# Patient Record
Sex: FEMALE | Race: WHITE | Employment: OTHER | URBAN - METROPOLITAN AREA
[De-identification: names, ages, dates, MRNs, and addresses within clinical notes are randomized per-mention and may not be internally consistent; named-entity substitution may affect disease eponyms.]

---

## 2020-11-03 PROBLEM — I10 HTN (HYPERTENSION): Status: RESOLVED | Noted: 2020-11-03 | Resolved: 2020-11-03

## 2021-08-16 ENCOUNTER — HOSPITAL ENCOUNTER (INPATIENT)
Age: 71
LOS: 9 days | Discharge: NURSING, REHAB OR LONG-TERM CARE | DRG: 683 | End: 2021-08-25
Attending: EMERGENCY MEDICINE | Admitting: INTERNAL MEDICINE
Payer: MEDICARE

## 2021-08-16 ENCOUNTER — APPOINTMENT (OUTPATIENT)
Dept: CT IMAGING | Age: 71
DRG: 683 | End: 2021-08-16
Payer: MEDICARE

## 2021-08-16 ENCOUNTER — APPOINTMENT (OUTPATIENT)
Dept: GENERAL RADIOLOGY | Age: 71
DRG: 683 | End: 2021-08-16
Payer: MEDICARE

## 2021-08-16 DIAGNOSIS — F41.1 GAD (GENERALIZED ANXIETY DISORDER): ICD-10-CM

## 2021-08-16 DIAGNOSIS — R53.1 GENERAL WEAKNESS: Primary | ICD-10-CM

## 2021-08-16 DIAGNOSIS — N17.9 AKI (ACUTE KIDNEY INJURY) (HCC): ICD-10-CM

## 2021-08-16 PROBLEM — N18.9 ACUTE ON CHRONIC RENAL INSUFFICIENCY: Status: ACTIVE | Noted: 2021-08-16

## 2021-08-16 PROBLEM — N28.9 ACUTE ON CHRONIC RENAL INSUFFICIENCY: Status: ACTIVE | Noted: 2021-08-16

## 2021-08-16 LAB
A/G RATIO: 1.6 (ref 1.1–2.2)
ALBUMIN SERPL-MCNC: 4.9 G/DL (ref 3.4–5)
ALP BLD-CCNC: 57 U/L (ref 40–129)
ALT SERPL-CCNC: 12 U/L (ref 10–40)
ANION GAP SERPL CALCULATED.3IONS-SCNC: 19 MMOL/L (ref 3–16)
AST SERPL-CCNC: 22 U/L (ref 15–37)
BASOPHILS ABSOLUTE: 0.1 K/UL (ref 0–0.2)
BASOPHILS RELATIVE PERCENT: 1.2 %
BILIRUB SERPL-MCNC: <0.2 MG/DL (ref 0–1)
BILIRUBIN URINE: NEGATIVE
BLOOD, URINE: NEGATIVE
BUN BLDV-MCNC: 37 MG/DL (ref 7–20)
CALCIUM SERPL-MCNC: 10.3 MG/DL (ref 8.3–10.6)
CHLORIDE BLD-SCNC: 96 MMOL/L (ref 99–110)
CLARITY: CLEAR
CO2: 18 MMOL/L (ref 21–32)
COLOR: YELLOW
CREAT SERPL-MCNC: 3 MG/DL (ref 0.6–1.2)
EOSINOPHILS ABSOLUTE: 0.1 K/UL (ref 0–0.6)
EOSINOPHILS RELATIVE PERCENT: 1.5 %
GFR AFRICAN AMERICAN: 19
GFR NON-AFRICAN AMERICAN: 15
GLOBULIN: 3.1 G/DL
GLUCOSE BLD-MCNC: 88 MG/DL (ref 70–99)
GLUCOSE URINE: NEGATIVE MG/DL
HCT VFR BLD CALC: 33.3 % (ref 36–48)
HEMOGLOBIN: 11.2 G/DL (ref 12–16)
KETONES, URINE: NEGATIVE MG/DL
LEUKOCYTE ESTERASE, URINE: NEGATIVE
LYMPHOCYTES ABSOLUTE: 1.7 K/UL (ref 1–5.1)
LYMPHOCYTES RELATIVE PERCENT: 28.7 %
MCH RBC QN AUTO: 29.7 PG (ref 26–34)
MCHC RBC AUTO-ENTMCNC: 33.6 G/DL (ref 31–36)
MCV RBC AUTO: 88.4 FL (ref 80–100)
MICROSCOPIC EXAMINATION: NORMAL
MONOCYTES ABSOLUTE: 0.4 K/UL (ref 0–1.3)
MONOCYTES RELATIVE PERCENT: 5.8 %
NEUTROPHILS ABSOLUTE: 3.8 K/UL (ref 1.7–7.7)
NEUTROPHILS RELATIVE PERCENT: 62.8 %
NITRITE, URINE: NEGATIVE
PDW BLD-RTO: 20.3 % (ref 12.4–15.4)
PH UA: 6 (ref 5–8)
PLATELET # BLD: 200 K/UL (ref 135–450)
PMV BLD AUTO: 8.1 FL (ref 5–10.5)
POTASSIUM REFLEX MAGNESIUM: 3.7 MMOL/L (ref 3.5–5.1)
PRO-BNP: 1059 PG/ML (ref 0–124)
PROTEIN UA: NEGATIVE MG/DL
RBC # BLD: 3.76 M/UL (ref 4–5.2)
SODIUM BLD-SCNC: 133 MMOL/L (ref 136–145)
SPECIFIC GRAVITY UA: 1.01 (ref 1–1.03)
TOTAL PROTEIN: 8 G/DL (ref 6.4–8.2)
TROPONIN: 0.07 NG/ML
URINE REFLEX TO CULTURE: NORMAL
URINE TYPE: NORMAL
UROBILINOGEN, URINE: 0.2 E.U./DL
WBC # BLD: 6.1 K/UL (ref 4–11)

## 2021-08-16 PROCEDURE — 93005 ELECTROCARDIOGRAM TRACING: CPT | Performed by: NURSE PRACTITIONER

## 2021-08-16 PROCEDURE — 80053 COMPREHEN METABOLIC PANEL: CPT

## 2021-08-16 PROCEDURE — 81003 URINALYSIS AUTO W/O SCOPE: CPT

## 2021-08-16 PROCEDURE — 2580000003 HC RX 258: Performed by: NURSE PRACTITIONER

## 2021-08-16 PROCEDURE — 70450 CT HEAD/BRAIN W/O DYE: CPT

## 2021-08-16 PROCEDURE — 83880 ASSAY OF NATRIURETIC PEPTIDE: CPT

## 2021-08-16 PROCEDURE — U0005 INFEC AGEN DETEC AMPLI PROBE: HCPCS

## 2021-08-16 PROCEDURE — 85025 COMPLETE CBC W/AUTO DIFF WBC: CPT

## 2021-08-16 PROCEDURE — U0003 INFECTIOUS AGENT DETECTION BY NUCLEIC ACID (DNA OR RNA); SEVERE ACUTE RESPIRATORY SYNDROME CORONAVIRUS 2 (SARS-COV-2) (CORONAVIRUS DISEASE [COVID-19]), AMPLIFIED PROBE TECHNIQUE, MAKING USE OF HIGH THROUGHPUT TECHNOLOGIES AS DESCRIBED BY CMS-2020-01-R: HCPCS

## 2021-08-16 PROCEDURE — 71045 X-RAY EXAM CHEST 1 VIEW: CPT

## 2021-08-16 PROCEDURE — 1200000000 HC SEMI PRIVATE

## 2021-08-16 PROCEDURE — 84484 ASSAY OF TROPONIN QUANT: CPT

## 2021-08-16 PROCEDURE — 99284 EMERGENCY DEPT VISIT MOD MDM: CPT

## 2021-08-16 RX ORDER — 0.9 % SODIUM CHLORIDE 0.9 %
1000 INTRAVENOUS SOLUTION INTRAVENOUS ONCE
Status: COMPLETED | OUTPATIENT
Start: 2021-08-16 | End: 2021-08-16

## 2021-08-16 RX ADMIN — SODIUM CHLORIDE 1000 ML: 9 INJECTION, SOLUTION INTRAVENOUS at 22:35

## 2021-08-16 ASSESSMENT — PAIN SCALES - GENERAL: PAINLEVEL_OUTOF10: 8

## 2021-08-16 ASSESSMENT — PAIN DESCRIPTION - DESCRIPTORS: DESCRIPTORS: ACHING;DISCOMFORT

## 2021-08-16 ASSESSMENT — ENCOUNTER SYMPTOMS
VOMITING: 0
ABDOMINAL PAIN: 0
COLOR CHANGE: 0
NAUSEA: 0
DIARRHEA: 0
SHORTNESS OF BREATH: 0
COUGH: 0
ABDOMINAL DISTENTION: 0

## 2021-08-16 ASSESSMENT — PAIN DESCRIPTION - LOCATION: LOCATION: GENERALIZED

## 2021-08-16 ASSESSMENT — PAIN DESCRIPTION - PAIN TYPE: TYPE: ACUTE PAIN

## 2021-08-17 LAB
ALBUMIN SERPL-MCNC: 4.1 G/DL (ref 3.4–5)
ANION GAP SERPL CALCULATED.3IONS-SCNC: 19 MMOL/L (ref 3–16)
BUN BLDV-MCNC: 37 MG/DL (ref 7–20)
CALCIUM SERPL-MCNC: 8.8 MG/DL (ref 8.3–10.6)
CHLORIDE BLD-SCNC: 105 MMOL/L (ref 99–110)
CO2: 15 MMOL/L (ref 21–32)
CREAT SERPL-MCNC: 3.3 MG/DL (ref 0.6–1.2)
CREATININE URINE: 36.9 MG/DL (ref 28–259)
GFR AFRICAN AMERICAN: 17
GFR NON-AFRICAN AMERICAN: 14
GLUCOSE BLD-MCNC: 75 MG/DL (ref 70–99)
OSMOLALITY URINE: 307 MOSM/KG (ref 390–1070)
PHOSPHORUS: 3.1 MG/DL (ref 2.5–4.9)
POTASSIUM SERPL-SCNC: 3.7 MMOL/L (ref 3.5–5.1)
SARS-COV-2: NOT DETECTED
SODIUM BLD-SCNC: 139 MMOL/L (ref 136–145)
SODIUM URINE: 90 MMOL/L
T4 FREE: 0.2 NG/DL (ref 0.9–1.8)
TROPONIN: 0.06 NG/ML
TSH REFLEX: 96.78 UIU/ML (ref 0.27–4.2)

## 2021-08-17 PROCEDURE — 1200000000 HC SEMI PRIVATE

## 2021-08-17 PROCEDURE — 2580000003 HC RX 258: Performed by: NURSE PRACTITIONER

## 2021-08-17 PROCEDURE — 94761 N-INVAS EAR/PLS OXIMETRY MLT: CPT

## 2021-08-17 PROCEDURE — 82570 ASSAY OF URINE CREATININE: CPT

## 2021-08-17 PROCEDURE — 6370000000 HC RX 637 (ALT 250 FOR IP): Performed by: INTERNAL MEDICINE

## 2021-08-17 PROCEDURE — 97162 PT EVAL MOD COMPLEX 30 MIN: CPT

## 2021-08-17 PROCEDURE — 97530 THERAPEUTIC ACTIVITIES: CPT

## 2021-08-17 PROCEDURE — 97165 OT EVAL LOW COMPLEX 30 MIN: CPT

## 2021-08-17 PROCEDURE — 84484 ASSAY OF TROPONIN QUANT: CPT

## 2021-08-17 PROCEDURE — 84300 ASSAY OF URINE SODIUM: CPT

## 2021-08-17 PROCEDURE — 6370000000 HC RX 637 (ALT 250 FOR IP): Performed by: NURSE PRACTITIONER

## 2021-08-17 PROCEDURE — 6360000002 HC RX W HCPCS: Performed by: NURSE PRACTITIONER

## 2021-08-17 PROCEDURE — 97535 SELF CARE MNGMENT TRAINING: CPT

## 2021-08-17 PROCEDURE — 83935 ASSAY OF URINE OSMOLALITY: CPT

## 2021-08-17 PROCEDURE — 84439 ASSAY OF FREE THYROXINE: CPT

## 2021-08-17 PROCEDURE — 84443 ASSAY THYROID STIM HORMONE: CPT

## 2021-08-17 PROCEDURE — 36415 COLL VENOUS BLD VENIPUNCTURE: CPT

## 2021-08-17 PROCEDURE — 80069 RENAL FUNCTION PANEL: CPT

## 2021-08-17 PROCEDURE — 51702 INSERT TEMP BLADDER CATH: CPT

## 2021-08-17 RX ORDER — ACETAMINOPHEN 325 MG/1
650 TABLET ORAL EVERY 6 HOURS PRN
Status: DISCONTINUED | OUTPATIENT
Start: 2021-08-17 | End: 2021-08-25 | Stop reason: HOSPADM

## 2021-08-17 RX ORDER — NICOTINE 21 MG/24HR
1 PATCH, TRANSDERMAL 24 HOURS TRANSDERMAL DAILY
Status: DISCONTINUED | OUTPATIENT
Start: 2021-08-17 | End: 2021-08-25 | Stop reason: HOSPADM

## 2021-08-17 RX ORDER — DIVALPROEX SODIUM 125 MG/1
125 CAPSULE, COATED PELLETS ORAL ONCE
Status: COMPLETED | OUTPATIENT
Start: 2021-08-17 | End: 2021-08-17

## 2021-08-17 RX ORDER — SODIUM CHLORIDE 0.9 % (FLUSH) 0.9 %
5-40 SYRINGE (ML) INJECTION EVERY 12 HOURS SCHEDULED
Status: DISCONTINUED | OUTPATIENT
Start: 2021-08-17 | End: 2021-08-25 | Stop reason: HOSPADM

## 2021-08-17 RX ORDER — ONDANSETRON 4 MG/1
4 TABLET, ORALLY DISINTEGRATING ORAL EVERY 8 HOURS PRN
Status: DISCONTINUED | OUTPATIENT
Start: 2021-08-17 | End: 2021-08-25 | Stop reason: HOSPADM

## 2021-08-17 RX ORDER — ATORVASTATIN CALCIUM 40 MG/1
40 TABLET, FILM COATED ORAL NIGHTLY
Status: DISCONTINUED | OUTPATIENT
Start: 2021-08-17 | End: 2021-08-25 | Stop reason: HOSPADM

## 2021-08-17 RX ORDER — ACETAMINOPHEN 650 MG/1
650 SUPPOSITORY RECTAL EVERY 6 HOURS PRN
Status: DISCONTINUED | OUTPATIENT
Start: 2021-08-17 | End: 2021-08-25 | Stop reason: HOSPADM

## 2021-08-17 RX ORDER — HEPARIN SODIUM 5000 [USP'U]/ML
5000 INJECTION, SOLUTION INTRAVENOUS; SUBCUTANEOUS 2 TIMES DAILY
Status: DISCONTINUED | OUTPATIENT
Start: 2021-08-17 | End: 2021-08-25 | Stop reason: HOSPADM

## 2021-08-17 RX ORDER — SODIUM CHLORIDE 9 MG/ML
INJECTION, SOLUTION INTRAVENOUS CONTINUOUS
Status: DISCONTINUED | OUTPATIENT
Start: 2021-08-17 | End: 2021-08-18

## 2021-08-17 RX ORDER — ONDANSETRON 2 MG/ML
4 INJECTION INTRAMUSCULAR; INTRAVENOUS EVERY 6 HOURS PRN
Status: DISCONTINUED | OUTPATIENT
Start: 2021-08-17 | End: 2021-08-25 | Stop reason: HOSPADM

## 2021-08-17 RX ORDER — FERROUS SULFATE TAB EC 324 MG (65 MG FE EQUIVALENT) 324 (65 FE) MG
324 TABLET DELAYED RESPONSE ORAL
Status: DISCONTINUED | OUTPATIENT
Start: 2021-08-17 | End: 2021-08-25 | Stop reason: HOSPADM

## 2021-08-17 RX ORDER — SODIUM CHLORIDE 9 MG/ML
25 INJECTION, SOLUTION INTRAVENOUS PRN
Status: DISCONTINUED | OUTPATIENT
Start: 2021-08-17 | End: 2021-08-25 | Stop reason: HOSPADM

## 2021-08-17 RX ORDER — LANOLIN ALCOHOL/MO/W.PET/CERES
3 CREAM (GRAM) TOPICAL NIGHTLY
Status: DISCONTINUED | OUTPATIENT
Start: 2021-08-17 | End: 2021-08-25 | Stop reason: HOSPADM

## 2021-08-17 RX ORDER — SODIUM CHLORIDE 0.9 % (FLUSH) 0.9 %
5-40 SYRINGE (ML) INJECTION PRN
Status: DISCONTINUED | OUTPATIENT
Start: 2021-08-17 | End: 2021-08-25 | Stop reason: HOSPADM

## 2021-08-17 RX ORDER — LEVOTHYROXINE SODIUM 0.12 MG/1
250 TABLET ORAL DAILY
Status: DISCONTINUED | OUTPATIENT
Start: 2021-08-17 | End: 2021-08-25 | Stop reason: HOSPADM

## 2021-08-17 RX ORDER — FAMOTIDINE 20 MG/1
10 TABLET, FILM COATED ORAL DAILY
Status: DISCONTINUED | OUTPATIENT
Start: 2021-08-17 | End: 2021-08-25 | Stop reason: HOSPADM

## 2021-08-17 RX ADMIN — Medication 3 MG: at 22:04

## 2021-08-17 RX ADMIN — Medication 3 MG: at 01:29

## 2021-08-17 RX ADMIN — HEPARIN SODIUM 5000 UNITS: 5000 INJECTION INTRAVENOUS; SUBCUTANEOUS at 08:57

## 2021-08-17 RX ADMIN — SODIUM CHLORIDE, PRESERVATIVE FREE 10 ML: 5 INJECTION INTRAVENOUS at 22:04

## 2021-08-17 RX ADMIN — SODIUM CHLORIDE: 9 INJECTION, SOLUTION INTRAVENOUS at 13:22

## 2021-08-17 RX ADMIN — LEVOTHYROXINE SODIUM 250 MCG: 0.12 TABLET ORAL at 06:15

## 2021-08-17 RX ADMIN — ACETAMINOPHEN 650 MG: 325 TABLET ORAL at 09:18

## 2021-08-17 RX ADMIN — ACETAMINOPHEN 650 MG: 325 TABLET ORAL at 23:45

## 2021-08-17 RX ADMIN — ACETAMINOPHEN 650 MG: 325 TABLET ORAL at 16:48

## 2021-08-17 RX ADMIN — FERROUS SULFATE TAB EC 324 MG (65 MG FE EQUIVALENT) 324 MG: 324 (65 FE) TABLET DELAYED RESPONSE at 16:49

## 2021-08-17 RX ADMIN — FAMOTIDINE 10 MG: 20 TABLET ORAL at 08:57

## 2021-08-17 RX ADMIN — HEPARIN SODIUM 5000 UNITS: 5000 INJECTION INTRAVENOUS; SUBCUTANEOUS at 22:05

## 2021-08-17 RX ADMIN — ONDANSETRON 4 MG: 2 INJECTION INTRAMUSCULAR; INTRAVENOUS at 13:20

## 2021-08-17 RX ADMIN — ATORVASTATIN CALCIUM 40 MG: 40 TABLET, FILM COATED ORAL at 22:04

## 2021-08-17 RX ADMIN — DIVALPROEX SODIUM 125 MG: 125 CAPSULE ORAL at 16:49

## 2021-08-17 RX ADMIN — FERROUS SULFATE TAB EC 324 MG (65 MG FE EQUIVALENT) 324 MG: 324 (65 FE) TABLET DELAYED RESPONSE at 08:57

## 2021-08-17 RX ADMIN — SODIUM CHLORIDE: 9 INJECTION, SOLUTION INTRAVENOUS at 01:38

## 2021-08-17 ASSESSMENT — PAIN SCALES - GENERAL
PAINLEVEL_OUTOF10: 10
PAINLEVEL_OUTOF10: 0
PAINLEVEL_OUTOF10: 0
PAINLEVEL_OUTOF10: 8
PAINLEVEL_OUTOF10: 6

## 2021-08-17 ASSESSMENT — ENCOUNTER SYMPTOMS
EYE DISCHARGE: 0
VOMITING: 0
COUGH: 0
CONSTIPATION: 0
EYE ITCHING: 0
ABDOMINAL PAIN: 0
COLOR CHANGE: 0
SHORTNESS OF BREATH: 0

## 2021-08-17 ASSESSMENT — PAIN - FUNCTIONAL ASSESSMENT
PAIN_FUNCTIONAL_ASSESSMENT: ACTIVITIES ARE NOT PREVENTED
PAIN_FUNCTIONAL_ASSESSMENT: ACTIVITIES ARE NOT PREVENTED

## 2021-08-17 ASSESSMENT — PAIN DESCRIPTION - FREQUENCY
FREQUENCY: CONTINUOUS
FREQUENCY: CONTINUOUS

## 2021-08-17 ASSESSMENT — PAIN DESCRIPTION - PAIN TYPE
TYPE: ACUTE PAIN
TYPE: ACUTE PAIN

## 2021-08-17 ASSESSMENT — PAIN DESCRIPTION - DESCRIPTORS
DESCRIPTORS: ACHING
DESCRIPTORS: ACHING

## 2021-08-17 ASSESSMENT — PAIN DESCRIPTION - PROGRESSION
CLINICAL_PROGRESSION: NOT CHANGED
CLINICAL_PROGRESSION: NOT CHANGED

## 2021-08-17 ASSESSMENT — PAIN DESCRIPTION - LOCATION
LOCATION: GENERALIZED
LOCATION: GENERALIZED

## 2021-08-17 ASSESSMENT — PAIN DESCRIPTION - ORIENTATION: ORIENTATION: OTHER (COMMENT)

## 2021-08-17 ASSESSMENT — PAIN DESCRIPTION - ONSET
ONSET: ON-GOING
ONSET: ON-GOING

## 2021-08-17 NOTE — PROGRESS NOTES
Physician Progress Note      PATIENT:               Nelda Carrillo  CSN #:                  [id number]  :                       1950  ADMIT DATE:       2021 8:15 PM  [address] DATE:  RESPONDING  PROVIDER #:        Kay Szymanski MD        QUERY TEXT:    Stage of Chronic Kidney Disease: Please provide further specificity, if known. Clinical indicators include: bun, creatinine, acute on chronic kidney disease  Options provided:  -- Chronic kidney disease stage 1  -- Chronic kidney disease stage 2  -- Chronic kidney disease stage 3  -- Chronic kidney disease stage 3a  -- Chronic kidney disease stage 3b  -- Chronic kidney disease stage 4  -- Chronic kidney disease stage 5  -- Chronic kidney disease stage 5, requiring dialysis  -- End stage renal disease  -- Other - I will add my own diagnosis  -- Disagree - Not applicable / Not valid  -- Disagree - Clinically Unable to determine / Unknown        PROVIDER RESPONSE TEXT:    The patient has chronic kidney disease stage 4.       Electronically signed by:  Kay Szymanski MD 2021 12:54 PM

## 2021-08-17 NOTE — PROGRESS NOTES
Pt continues to try to get out of bed, not redirectable by tele camera. Notified Dr Karly Ramirez, want to avoid restraints, see new order for depakote 125mg x 1.
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
## 2021-08-17 NOTE — ED NOTES
Report given to [facility] VICKI FERGUSON. Son called for updated, awaiting results. Request to call Patsy Weldon with care plan. Patient verbalized ok to speak with both.       Vilma Jade RN  08/16/21 4143

## 2021-08-17 NOTE — PLAN OF CARE
Problem: Falls - Risk of:  Goal: Will remain free from falls  Description: Will remain free from falls  8/17/2021 1404 by Nalini Dan RN  Outcome: Ongoing  8/17/2021 0323 by Dang Hendricks RN  Outcome: Ongoing  Goal: Absence of physical injury  Description: Absence of physical injury  8/17/2021 1404 by Nalini Dan RN  Outcome: Ongoing  8/17/2021 0323 by Dang Hendricks RN  Outcome: Ongoing     Problem: Skin Integrity:  Goal: Will show no infection signs and symptoms  Description: Will show no infection signs and symptoms  8/17/2021 1404 by Nalini Dan RN  Outcome: Ongoing  8/17/2021 0323 by Dang Hendricks RN  Outcome: Ongoing  Goal: Absence of new skin breakdown  Description: Absence of new skin breakdown  8/17/2021 1404 by Nalini Dan RN  Outcome: Ongoing  8/17/2021 0323 by Dang Hendricks RN  Outcome: Ongoing
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
## 2021-08-17 NOTE — PROGRESS NOTES
4 Eyes Skin Assessment     NAME:  Too Buchanan OF BIRTH:  1950  MEDICAL RECORD NUMBER:  [id number]    The patient is being assess for  Admission    I agree that 2 RN's have performed a thorough Head to Toe Skin Assessment on the patient. ALL assessment sites listed below have been assessed. Areas assessed by both nurses:    Head, Face, Ears, Shoulders, Back, Chest, Arms, Elbows, Hands, Sacrum. Buttock, Coccyx, Ischium and Legs. Feet and Heels        Does the Patient have a Wound?  No noted wound(s)       Erick Prevention initiated:  Yes   Wound Care Orders initiated:  No    Pressure Injury (Stage 3,4, Unstageable, DTI, NWPT, and Complex wounds) if present place consult order under [de-identified] No    New and Established Ostomies if present place consult order under : No      Nurse 1 eSignature: Electronically signed by Sandee De La Cruz RN on 8/17/21 at 2:02 AM EDT    **SHARE this note so that the co-signing nurse is able to place an eSignature**    Nurse 2 eSignature: Electronically signed by Cindy Mansfield RN on 8/17/21 at 2:04 AM EDT

## 2021-08-17 NOTE — CARE COORDINATION
Pt remains confused, attempted to call son, Irena Meyer, to complete assessment. Left a HIPPA compliant vm with return number. Electronically signed by Toñito Ford RN on 8/17/21 at 2:51 PM EDT    Still no return phone call from son so called second emergency contact, Prabhu, & left HIPPA compliant vm with return number.     Electronically signed by Toñito Ford RN on 8/17/21 at 3:53 PM EDT

## 2021-08-17 NOTE — PROGRESS NOTES
Pt continues to try to get out of bed to use restroom, setting off bed alarm. Pt very unsteady on her feet, purewick in place, awaiting PT/OT balbir. Placed tele camera in room for pt safety.

## 2021-08-17 NOTE — ED PROVIDER NOTES
[address] [facility]        Pt Name: Agustina Doyle  MRN: [id number]  [place] 1950  Date of evaluation: 8/16/2021  Provider: EWELINA Rader CNP  PCP: No primary care provider on file. Note Started: 10:02 PM EDT   Attending provider:  David Sousa MD      CHIEF COMPLAINT       Chief Complaint   Patient presents with    Generalized Body Aches     pt to ED via squad c/o body aches starting yesterday. HISTORY OF PRESENT ILLNESS   (Location, Timing/Onset, Context/Setting, Quality, Duration, Modifying Factors, Severity, Associated Signs and Symptoms)  Note limiting factors. Chief Complaint: Weakness    Agustina Doyle is a 79 y.o. female with PMH significant for HLD, HTN, hypothyroid, cigarette smoking, and COPD who presents to the emergency department today via EMS from home complaining of weakness. On arrival to ER patient is alert and oriented to person and place but not year. Her baseline mental status is unknown at time of initial exam.  She denies any complaints, but when I asked her why they called 911 she advised that she was just too weak to get up. Family Og Mcnulty) reports that she has not taken meds in \"months\" because she has no money. Also electric was shut off at home due to no money. Family states that patient is weak with no energy to move around or walk. Family also advised the patient has dementia and the confusion is not abnormal.    Nursing Notes were all reviewed and agreed with or any disagreements were addressed in the HPI. REVIEW OF SYSTEMS    (2-9 systems for level 4, 10 or more for level 5)     Review of Systems   Constitutional: Positive for fatigue. Negative for chills, diaphoresis and fever. HENT: Negative. Respiratory: Negative for cough and shortness of breath. Cardiovascular: Negative for chest pain.    Gastrointestinal: Negative for abdominal distention, abdominal pain, 1 tablet by mouth daily. MELOXICAM (MOBIC) 15 MG TABLET    TAKE 1 TABLET BY MOUTH DAILY. OLANZAPINE (ZYPREXA) 5 MG TABLET    Take 1 tablet by mouth nightly    OMEPRAZOLE (PRILOSEC) 20 MG DELAYED RELEASE CAPSULE    TAKE 1 CAPSULE BY MOUTH DAILY    OXYCODONE HCL (OXY-IR) 10 MG IMMEDIATE RELEASE TABLET    Take 1 tablet by mouth every 6 hours as needed for Pain Earliest Fill Date: 4/21/15. PROMETHAZINE (PHENERGAN) 25 MG TABLET    TAKE 1 TABLET BY MOUTH EVERY 6 HOURS AS NEEDED FOR NAUSEA FOR UP TO 7 DAYS. SYNTHROID 125 MCG TABLET    Take 1 tablet by mouth daily    SYNTHROID 125 MCG TABLET    TAKE 2 TABLETS BY MOUTH EVERY DAY    VENLAFAXINE (EFFEXOR) 75 MG TABLET    TAKE 1 TABLET BY MOUTH THREE TIMES A DAY         ALLERGIES     Sulfa antibiotics    FAMILYHISTORY       Family History   Problem Relation Age of Onset    Heart Disease Mother     High Blood Pressure Mother           SOCIAL HISTORY       Social History     Tobacco Use    Smoking status: Current Every Day Smoker     Packs/day: 0.50     Types: Cigarettes   Substance Use Topics    Alcohol use: No    Drug use: No       SCREENINGS             PHYSICAL EXAM    (up to 7 for level 4, 8 or more for level 5)     ED Triage Vitals [08/16/21 2026]   BP Temp Temp Source Pulse Resp SpO2 Height Weight   (!) 166/72 98.2 °F (36.8 °C) Oral 66 19 97 % -- --       Physical Exam  Vitals and nursing note reviewed. Constitutional:       General: She is not in acute distress. Appearance: Normal appearance. She is well-developed. She is not toxic-appearing. HENT:      Head: Normocephalic and atraumatic. Right Ear: Tympanic membrane and ear canal normal.      Left Ear: Tympanic membrane and ear canal normal.      Mouth/Throat:      Lips: Pink. Mouth: Mucous membranes are dry. Pharynx: Oropharynx is clear. Eyes:      General: No scleral icterus. Extraocular Movements: Extraocular movements intact.       Conjunctiva/sclera: Conjunctivae normal. Pupils: Pupils are equal, round, and reactive to light. Neck:      Vascular: No JVD. Cardiovascular:      Rate and Rhythm: Normal rate and regular rhythm. Heart sounds: Normal heart sounds. Pulmonary:      Effort: Pulmonary effort is normal. No respiratory distress. Breath sounds: Normal breath sounds. Abdominal:      General: There is no distension. Palpations: Abdomen is soft. Abdomen is not rigid. Tenderness: There is no abdominal tenderness. Musculoskeletal:         General: Normal range of motion. Cervical back: Normal range of motion and neck supple. No rigidity. Skin:     General: Skin is warm and dry. Capillary Refill: Capillary refill takes less than 2 seconds. Findings: No rash. Neurological:      General: No focal deficit present. Mental Status: She is alert. She is confused.    Psychiatric:         Mood and Affect: Mood normal.         DIAGNOSTIC RESULTS   LABS:    Labs Reviewed   TROPONIN - Abnormal; Notable for the following components:       Result Value    Troponin 0.07 (*)     All other components within normal limits    Narrative:     Performed at:  [address] [address]   Phone [phone]   BRAIN NATRIURETIC PEPTIDE - Abnormal; Notable for the following components:    Pro-BNP 1,059 (*)     All other components within normal limits    Narrative:     Performed at:  [address] [address]   Phone [phone]   CBC WITH AUTO DIFFERENTIAL - Abnormal; Notable for the following components:    RBC 3.76 (*)     Hemoglobin 11.2 (*)     Hematocrit 33.3 (*)     RDW 20.3 (*)     All other components within normal limits    Narrative:     Performed at:  [address] [address]   Phone [phone]   COMPREHENSIVE METABOLIC PANEL W/ REFLEX TO MG FOR LOW K - Abnormal; Notable for the following components:    Sodium 133 (*)     Chloride 96 (*)     CO2 18 (*)     Anion Gap 19 (*)     BUN 37 (*)     CREATININE 3.0 (*)     GFR Non- 15 (*)     GFR  19 (*)     All other components within normal limits    Narrative:     Performed at:  [facility]  [address] [facility] [place] [address]   Phone [phone]   URINE RT REFLEX TO CULTURE   COVID-19   TSH WITH REFLEX       When ordered only abnormal lab results are displayed. All other labs were within normal range or not returned as of this dictation. EKG: When ordered, EKG's are interpreted by the Emergency Department Physician in the absence of a cardiologist.  Please see their note for interpretation of EKG. RADIOLOGY:   Non-plain film images such as CT, Ultrasound and MRI are read by the radiologist. Plain radiographic images are visualized and preliminarily interpreted by the ED Provider with the below findings:        Interpretation per the Radiologist below, if available at the time of this note:    CT HEAD WO CONTRAST   Final Result   No acute intracranial abnormality. Encephalomalacia in the right frontal periventricular white matter/corona   radiata, likely from prior insult/infarction. XR CHEST PORTABLE   Final Result   No acute finding in the chest.           XR CHEST PORTABLE    Result Date: 8/16/2021  EXAMINATION: ONE XRAY VIEW OF THE CHEST 8/16/2021 8:35 pm COMPARISON: 06/28/2014 radiograph HISTORY: ORDERING SYSTEM PROVIDED HISTORY: SOB TECHNOLOGIST PROVIDED HISTORY: Reason for exam:->SOB Reason for Exam: sob Acuity: Acute Type of Exam: Initial FINDINGS: The heart is mildly enlarged. Mediastinum and pulmonary vascularity are normal.  The lungs are clear. No significant skeletal finding.      No acute finding in the chest.           PROCEDURES   Unless otherwise noted below, none     Procedures    CRITICAL CARE TIME N/A    CONSULTS:  IP CONSULT TO HOSPITALIST  IP CONSULT TO CASE MANAGEMENT      EMERGENCY DEPARTMENT COURSE and DIFFERENTIAL DIAGNOSIS/MDM:   Vitals:    Vitals:    08/16/21 2026 08/16/21 2223 08/16/21 2241 08/16/21 2301   BP: (!) 166/72 (!) 144/68 (!) 153/61 (!) 156/61   Pulse: 66 62 59 61   Resp: 19 17 16 15   Temp: 98.2 °F (36.8 °C)      TempSrc: Oral      SpO2: 97%          Patient was given the following medications:  Medications   0.9 % sodium chloride bolus (1,000 mLs Intravenous New Bag 8/16/21 2235)           Differential Diagnosis:    Hypoxemia/ischemic encephalopathy, hepatic encephalopathy   Seizure or postictal state   Alterations of glucose such as hypoglycemia and hyperglycemia    Alterations in perfusions such as hypotension and hypoperfusion    Alterations in electrolytes such as disturbances in sodium or calcium   Infectious processes such as sepsis from a pneumonia or urinary tract infection    Substance use or withdrawal, especially alcohol and drugs    Medication adverse event or interaction    Vitamin deficiencies such as Wernicke's encephalopathy    CNS lesion, injury, infection (CVA, subdural hematoma, meningitis, encephalitis)    Alterations in hormones such as thyroid or adrenal abnormalities    Alterations in cardiac functioning such as arrhythmia, MI or CHF    Alteration in temperature such as hyperthermia or hypothermia    Dehydration, sleep deprivation   Change in medical regimen    Alteration in lifestyle, environment, or personal relationships    Patient presents from home with weakness. See HPI for full presentation. Physical exam as above. 70-year-old female lying in bed in no acute distress. Awake alert and oriented to person and place but not year. Family advised that the confusion is not abnormal.  Previous labs are from 2014 but today shows worsening kidney function with a creatinine of 3 and GFR 15. Troponin 0.07. LFTs and potassium normal.  BNP 1000.   CBC shows normal

## 2021-08-17 NOTE — PROGRESS NOTES
Pt bladder scanned. 540-806 ml found in bladder. Pt straight cathed per orders. Output of 520 ml from straight cath. MD made aware, no new orders at this time.  Electronically signed by Danna Mendiola RN on 8/17/2021 at 4:45 AM

## 2021-08-17 NOTE — PROGRESS NOTES
Occupational Therapy   Occupational Therapy Initial Assessment/Treatment   Date: 2021   Patient Name: Dion Recio  MRN: [id number]     : 1950    Date of Service: 2021    Discharge Recommendations:  24 hour supervision or assist, Home with Home health OT, [address] scored a 19/24 on the AM-PAC ADL Inpatient form. Current research shows that an AM-PAC score of 18 or greater is typically associated with a discharge to the patient's home setting. Based on the patient's AM-PAC score, and their current ADL deficits, it is recommended that the patient have 2-3 sessions per week of Occupational Therapy at d/c to increase the patient's independence. At this time, this patient demonstrates the endurance and safety to discharge home with  (home  services) and a follow up treatment frequency of 2-3x/wk. Please see assessment section for further patient specific details. If patient discharges prior to next session this note will serve as a discharge summary. Please see below for the latest assessment towards goals. Assessment   Performance deficits / Impairments: Decreased functional mobility ; Decreased strength;Decreased endurance;Decreased ADL status; Decreased cognition  Assessment: Pt is a 73yr old female admitted for weakness. Pt reports baseline independence with ADLs and mobility without AD. Pt currently limited by decreased cognition and strength during ADLs. Pt required up to min A for LB ADLs and increased time for processing. Anticipate cognition with improve and pt will be safe to d/c with 24hr supervision/HHOT. Prognosis: Fair  Decision Making: Low Complexity  OT Education: OT Role;Transfer Training;Plan of Care;ADL Adaptive Strategies; Energy Conservation;Orientation  Barriers to Learning: cognition  REQUIRES OT FOLLOW UP: Yes  Activity Tolerance  Activity Tolerance: Treatment limited secondary to decreased cognition  Safety Devices  Safety Devices in place: Yes  Type of devices: All fall risk precautions in place; Left in chair;Call light within reach; Patient at risk for falls;Nurse notified; Chair alarm in place           Patient Diagnosis(es): The primary encounter diagnosis was General weakness. A diagnosis of MARIANELA (acute kidney injury) ([facility] [address] was also pertinent to this visit. has a past medical history of Concussion, HTN (hypertension), Hyperlipidemia, and Hypothyroid. has a past surgical history that includes Arm Surgery (1983) and Appendectomy (1980).            Restrictions  Restrictions/Precautions  Restrictions/Precautions: Fall Risk    Subjective   General  Chart Reviewed: Yes, Orders, Progress Notes, History and Physical, Labs  Patient assessed for rehabilitation services?: Yes  Additional Pertinent Hx: Dementia  Family / Caregiver Present: No  Referring Practitioner: Bekah Boo  Diagnosis: Weakness; Confusion  Subjective  Subjective: Pt in bed, agreeable to OT evaluation with encouragement  Patient Currently in Pain: Denies  Pain Assessment  Pain Level: 8  Vital Signs  Resp: 18  Patient Currently in Pain: Denies  Oxygen Therapy  SpO2: 95 %  Pulse Oximeter Device Mode: Intermittent  Pulse Oximeter Device Location: Finger  O2 Device: None (Room air)  Social/Functional History  Social/Functional History  Lives With: Son (Niece lives on 2nd/3rd floor)  Type of Home: House  Home Layout: Multi-level, Performs ADL's on one level, Able to Live on Main level with bedroom/bathroom  Home Access: Stairs to enter with rails  Entrance Stairs - Number of Steps: 3 + 4  Bathroom Shower/Tub: Tub/Shower unit  Bathroom Toilet: Handicap height  Bathroom Equipment: Grab bars in shower, Shower chair  ADL Assistance: Independent  Homemaking Assistance: Needs assistance (Son Assist with all IADLs)  Homemaking Responsibilities: No  Ambulation Assistance: Independent  Transfer Assistance: Independent  Active : No  Patient's  Info: Family  Additional Comments: Pt is a questionable historian       Objective   Vision: Impaired  Vision Exceptions: Wears glasses at all times  Hearing: Exceptions to [facility]  Hearing Exceptions: Hard of hearing/hearing concerns    Orientation  Overall Orientation Status: Within Functional Limits     Balance  Sitting Balance: Supervision  Standing Balance: Contact guard assistance  Functional Mobility  Functional - Mobility Device: Rolling Walker  Activity: To/from bathroom  Assist Level: Contact guard assistance  Functional Mobility Comments: increased time due to processing/cognition  Toilet Transfers  Toilet - Technique: Ambulating  Equipment Used: Standard toilet  Toilet Transfer: Contact guard assistance  Toilet Transfers Comments: + grab bar; verbal cues for safety of hand placement  ADL  Feeding: Setup; Increased time to complete  LE Dressing: Minimal assistance  Toileting: Contact guard assistance (attempted voiding over toilet; pt managing LB briefs with CGA/SBA)  Additional Comments: Anticipate up to min A for LB ADLs due to cognition, strength and balance  Tone RUE  RUE Tone: Normotonic  Tone LUE  LUE Tone: Normotonic  Coordination  Movements Are Fluid And Coordinated: Yes     Bed mobility  Supine to Sit: Stand by assistance (HOB elevatedl hand rail-increased time due to cognition)  Sit to Supine: Unable to assess  Scooting: Supervision  Transfers  Sit to stand: Contact guard assistance  Stand to sit: Contact guard assistance  Transfer Comments: vc cues for safety of hand placement     Cognition  Overall Cognitive Status: Exceptions (hard of hearing)  Arousal/Alertness: Delayed responses to stimuli  Following Commands:  Follows one step commands with increased time  Attention Span: Attends with cues to redirect  Memory: Decreased short term memory  Safety Judgement: Decreased awareness of need for safety  Problem Solving: Assistance required to implement solutions;Assistance required to generate solutions  Insights: Decreased awareness of deficits  Initiation: Requires cues for all  Sequencing: Requires cues for some  Cognition Comment: increased time for processing                                        Plan   Plan  Times per week: 3-5  Current Treatment Recommendations: Strengthening, Functional Mobility Training, Cognitive Reorientation, Balance Training, Safety Education & Training, Self-Care / ADL, Endurance Training, Equipment Evaluation, Education, & procurement    G-Code     OutComes Score                                                  AM-PAC Score        AM-PAC Inpatient Daily Activity Raw Score: 19 (08/17/21 0957)  AM-PAC Inpatient ADL T-Scale Score : 40.22 (08/17/21 0957)  ADL Inpatient CMS 0-100% Score: 42.8 (08/17/21 0957)  ADL Inpatient CMS G-Code Modifier : CK (08/17/21 0957)    Goals  Short term goals  Time Frame for Short term goals: by d/c  Short term goal 1: Pt will complete LB dressing with mod I  Short term goal 2: Pt will complete toileting with mod I  Short term goal 3: Pt will complete bathing with supervision  Short term goal 4: Pt will complete bathroom mobility with LRAD as needed with supervision       Therapy Time   Individual Concurrent Group Co-treatment   Time In 0915         Time Out 9872         Minutes 40         Timed Code Treatment Minutes: 124 Steffany Dobson, OTR/L

## 2021-08-17 NOTE — PROGRESS NOTES
Pharmacy Medication Reconciliation Note     List of medications patient is currently taking is in process    Source of information:   1. OARRS  2. Rx disp hx    Notes regarding home medications:   1. Only med filled in the last year was Levothyroxine 125mg 60 tabs for 30 day supply Feb 2021  2. Removed Alprazolam,Oxycodone,Levothyroxine (wrong strengths)  3. Unable to talk to patient tonight  4.  Danish Geller messaged about meds with no fill history    Denies taking any other OTC or herbal medications    Dorota Warren [facility], [facility] 8/17/2021 1:21 AM

## 2021-08-17 NOTE — ED NOTES
Bed: E-47  Expected date:   Expected time:   Means of arrival: [facility]  Comments:  Body aches     Lisette Maria RN  08/16/21 2015

## 2021-08-17 NOTE — PROGRESS NOTES
Pt increasing agitated, has set bed alarm off three times in 20 mins trying to get up and get a cigarette, states she doesn't feel good and cant lay still. Notified Dr Fanta Hamm, see new orders for nicotine patch.

## 2021-08-17 NOTE — PROGRESS NOTES
Pt transferred from the emergency department to room [phone], and staying for MARIANELA. Pt is not currently in any pain at this time. Pt's vital signs are stable and there are no signs of distress at this time.  Electronically signed by Neeru Yanes RN on 8/17/2021 at 2:00 AM
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
## 2021-08-17 NOTE — PROGRESS NOTES
Physical Therapy    Facility/Department: [address] MED SURG  Initial Assessment    NAME: Fox Novak  : 1950  MRN: [id number]    Date of Service: 2021    Discharge Recommendations:  24 hour supervision or assist, Patient would benefit from continued therapy after discharge, S Level Eddie Mendoza scored a 19/24 on the AM-PAC short mobility form. Current research shows that an AM-PAC score of 18 or greater is typically associated with a discharge to the patient's home setting. Based on the patient's AM-PAC score and their current functional mobility deficits, it is recommended that the patient have 2-3 sessions per week of Physical Therapy at d/c to increase the patient's independence. At this time, this patient demonstrates the endurance and safety to discharge home with HHPT and a follow up treatment frequency of 2-3x/wk. Please see assessment section for further patient specific details. If patient discharges prior to next session this note will serve as a discharge summary. Please see below for the latest assessment towards goals. HOME HEALTH CARE: LEVEL 1 STANDARD     -Initial home health evaluation to occur within 24-48 hours, in patient home    -Home health agency to establish plan of care for patient over 60 day period    -Medication Reconciliation    -PCP Visit scheduled within seven days of discharge    -PT/OT to evaluate with goal of regaining prior level of functioning    -OT to evaluate if patient has [address] needs for personal care         Assessment   Body structures, Functions, Activity limitations: Decreased functional mobility ; Decreased ADL status; Decreased strength;Decreased safe awareness;Decreased cognition;Decreased endurance  Assessment: 79 y.o. female with PMHx of HTN, HLD and hypothyroidism presented to [facility] on 21 with c/o weakness. Pt found to have UTI.  Prior to admission, pt reports she was independent with her ADLs and ambulation without device (sometimes would push the baby stroller for balance). Pt currently functioning below baseline - requiring no more than CGA with mobility. Anticipate she will be able to return home with initial 24hr supv and level 1 HHPT. Treatment Diagnosis: impaired mobility  Prognosis: Good  Decision Making: Medium Complexity  History: see below  Exam: see below  Clinical Presentation: evolving  PT Education: PT Role;Plan of Care;General Safety;Gait Training;Functional Mobility Training;Transfer Training  REQUIRES PT FOLLOW UP: Yes  Activity Tolerance  Activity Tolerance: Patient limited by endurance; Patient limited by fatigue       Patient Diagnosis(es): The primary encounter diagnosis was General weakness. A diagnosis of MARIANELA (acute kidney injury) ([facility] [address] was also pertinent to this visit. has a past medical history of Concussion, HTN (hypertension), Hyperlipidemia, and Hypothyroid. has a past surgical history that includes Arm Surgery (1983) and Appendectomy (1980). Restrictions  Restrictions/Precautions  Restrictions/Precautions: Fall Risk     Vision/Hearing  Vision: Impaired  Vision Exceptions: Wears glasses at all times  Hearing: Exceptions to [facility] Exceptions: Hard of hearing/hearing concerns       Subjective  General  Chart Reviewed: Yes  Patient assessed for rehabilitation services?: Yes  Additional Pertinent Hx: 79 y.o. female with PMHx of HTN, HLD and hypothyroidism presented [facility]16[facility]21 with c/o weakness. Pt found to have UTI.   Response To Previous Treatment: Not applicable  Family / Caregiver Present: No  Referring Practitioner: EWELINA Gutierrez CNP  Referral Date : 08/17/21  Diagnosis: UTI  Follows Commands: Within Functional Limits  Subjective  Subjective: Pt is agreeable to PT  Pain Screening  Patient Currently in Pain: Denies          Orientation  Orientation  Overall Orientation Status: Within Functional Limits     Social/Functional History  Social/Functional History  Lives With: Son (Niece lives on 2nd/3rd floor)  Type of Home: House  Home Layout: Multi-level, Performs ADL's on one level, Able to Live on Main level with bedroom/bathroom  Home Access: Stairs to enter with rails  Entrance Stairs - Number of Steps: 3 + 4  Bathroom Shower/Tub: Tub/Shower unit  Bathroom Toilet: Handicap height  Bathroom Equipment: Grab bars in shower, Shower chair  ADL Assistance: Independent  Homemaking Assistance: Needs assistance (Son Assist with all IADLs)  Homemaking Responsibilities: No  Ambulation Assistance: Independent  Transfer Assistance: Independent  Active : No  Patient's  Info: Family  Additional Comments: Pt is a questionable historian     Cognition   Cognition  Overall Cognitive Status: Exceptions (hard of hearing)  Arousal/Alertness: Delayed responses to stimuli  Following Commands:  Follows one step commands with increased time  Attention Span: Attends with cues to redirect  Memory: Decreased short term memory  Safety Judgement: Decreased awareness of need for safety  Problem Solving: Assistance required to implement solutions;Assistance required to generate solutions  Insights: Decreased awareness of deficits  Initiation: Requires cues for all  Sequencing: Requires cues for some  Cognition Comment: increased time for processing    Objective  Strength RLE  Strength RLE: Exception  Comment: mild generalized weakness  Strength LLE  Strength LLE: Exception  Comment: mild generalized weakness  Motor Control  Gross Motor?: WFL     Bed mobility  Supine to Sit: Stand by assistance (H[facility] hand rail-increased time due to cognition)  Sit to Supine: Unable to assess  Scooting: Supervision  Transfers  Sit to Stand: Contact guard assistance  Stand to sit: Contact guard assistance  Ambulation  Ambulation?: Yes  Ambulation 1  Surface: level tile  Device: Rolling Walker  Assistance: Contact guard assistance  Gait Deviations: Slow Linh;Decreased step length;Decreased step height  Distance: 10' + 30'  Comments: Ambulated from bed to toilet, where she was able to don/doff brief with supv, then 30' to recliner. Balance  Posture: Fair  Sitting - Static: Good  Sitting - Dynamic: Good  Standing - Static: Fair;+ (@RW)  Standing - Dynamic: Fair (@RW)        Plan   Plan  Times per week: 3-5x/week  Current Treatment Recommendations: Strengthening, Functional Mobility Training, Transfer Training, Balance Training, Endurance Training, Gait Training, Patient/Caregiver Education & Training, Safety Education & Training, Equipment Evaluation, Education, & procurement, Neuromuscular Re-education  Safety Devices  Type of devices:  All fall risk precautions in place, Call light within reach, Gait belt, Patient at risk for falls, Left in chair, Chair alarm in place, Nurse notified, Elijah Hinton in use      AM-PAC Score  AM-PAC Inpatient Mobility Raw Score : 19 (08/17/21 0958)  AM-PAC Inpatient T-Scale Score : 45.44 (08/17/21 0958)  Mobility Inpatient CMS 0-100% Score: 41.77 (08/17/21 4844)  Mobility Inpatient CMS G-Code Modifier : CK (08/17/21 0958)          Goals  Short term goals  Time Frame for Short term goals: by acute discharge  Short term goal 1: bed mobility mod I  Short term goal 2: sit<>stand supv  Short term goal 3: ambulate > 36' with LRAD and SBA  Patient Goals   Patient goals : none stated       Therapy Time   Individual Concurrent Group Co-treatment   Time In 0920         Time Out 0955         Minutes 35         Timed Code Treatment Minutes: 25 Minutes       Alan Shoulder, PT

## 2021-08-17 NOTE — PROGRESS NOTES
Spoke with Dr Daphne Gutierres regarding 100ml total urine output this shift, see new orders to place a rojas. 1800: 16 Setswana rojas inserted. Pt tolerated well. Draining yellow  Urine.

## 2021-08-18 LAB
ALBUMIN SERPL-MCNC: 3.4 G/DL (ref 3.4–5)
ANION GAP SERPL CALCULATED.3IONS-SCNC: 15 MMOL/L (ref 3–16)
BUN BLDV-MCNC: 34 MG/DL (ref 7–20)
CALCIUM SERPL-MCNC: 8.3 MG/DL (ref 8.3–10.6)
CHLORIDE BLD-SCNC: 111 MMOL/L (ref 99–110)
CO2: 14 MMOL/L (ref 21–32)
CREAT SERPL-MCNC: 2.8 MG/DL (ref 0.6–1.2)
EKG ATRIAL RATE: 64 BPM
EKG DIAGNOSIS: NORMAL
EKG P AXIS: 41 DEGREES
EKG P-R INTERVAL: 132 MS
EKG Q-T INTERVAL: 440 MS
EKG QRS DURATION: 90 MS
EKG QTC CALCULATION (BAZETT): 453 MS
EKG R AXIS: 16 DEGREES
EKG T AXIS: 63 DEGREES
EKG VENTRICULAR RATE: 64 BPM
GFR AFRICAN AMERICAN: 20
GFR NON-AFRICAN AMERICAN: 17
GLUCOSE BLD-MCNC: 78 MG/DL (ref 70–99)
PHOSPHORUS: 3 MG/DL (ref 2.5–4.9)
POTASSIUM SERPL-SCNC: 3.5 MMOL/L (ref 3.5–5.1)
SODIUM BLD-SCNC: 140 MMOL/L (ref 136–145)

## 2021-08-18 PROCEDURE — 97530 THERAPEUTIC ACTIVITIES: CPT

## 2021-08-18 PROCEDURE — 2500000003 HC RX 250 WO HCPCS: Performed by: INTERNAL MEDICINE

## 2021-08-18 PROCEDURE — 6370000000 HC RX 637 (ALT 250 FOR IP): Performed by: NURSE PRACTITIONER

## 2021-08-18 PROCEDURE — 97535 SELF CARE MNGMENT TRAINING: CPT

## 2021-08-18 PROCEDURE — 51702 INSERT TEMP BLADDER CATH: CPT

## 2021-08-18 PROCEDURE — 36415 COLL VENOUS BLD VENIPUNCTURE: CPT

## 2021-08-18 PROCEDURE — 80069 RENAL FUNCTION PANEL: CPT

## 2021-08-18 PROCEDURE — 93010 ELECTROCARDIOGRAM REPORT: CPT | Performed by: INTERNAL MEDICINE

## 2021-08-18 PROCEDURE — 6370000000 HC RX 637 (ALT 250 FOR IP): Performed by: INTERNAL MEDICINE

## 2021-08-18 PROCEDURE — 6360000002 HC RX W HCPCS: Performed by: NURSE PRACTITIONER

## 2021-08-18 PROCEDURE — 94760 N-INVAS EAR/PLS OXIMETRY 1: CPT

## 2021-08-18 PROCEDURE — 2580000003 HC RX 258: Performed by: NURSE PRACTITIONER

## 2021-08-18 PROCEDURE — 2580000003 HC RX 258: Performed by: INTERNAL MEDICINE

## 2021-08-18 PROCEDURE — 1200000000 HC SEMI PRIVATE

## 2021-08-18 RX ADMIN — ACETAMINOPHEN 650 MG: 325 TABLET ORAL at 14:54

## 2021-08-18 RX ADMIN — FAMOTIDINE 10 MG: 20 TABLET ORAL at 09:53

## 2021-08-18 RX ADMIN — HEPARIN SODIUM 5000 UNITS: 5000 INJECTION INTRAVENOUS; SUBCUTANEOUS at 09:54

## 2021-08-18 RX ADMIN — Medication 3 MG: at 21:22

## 2021-08-18 RX ADMIN — ACETAMINOPHEN 650 MG: 325 TABLET ORAL at 21:22

## 2021-08-18 RX ADMIN — SODIUM CHLORIDE, PRESERVATIVE FREE 10 ML: 5 INJECTION INTRAVENOUS at 21:28

## 2021-08-18 RX ADMIN — FERROUS SULFATE TAB EC 324 MG (65 MG FE EQUIVALENT) 324 MG: 324 (65 FE) TABLET DELAYED RESPONSE at 09:53

## 2021-08-18 RX ADMIN — ATORVASTATIN CALCIUM 40 MG: 40 TABLET, FILM COATED ORAL at 21:22

## 2021-08-18 RX ADMIN — SODIUM BICARBONATE: 84 INJECTION, SOLUTION INTRAVENOUS at 10:04

## 2021-08-18 RX ADMIN — LEVOTHYROXINE SODIUM 250 MCG: 0.12 TABLET ORAL at 09:53

## 2021-08-18 RX ADMIN — HEPARIN SODIUM 5000 UNITS: 5000 INJECTION INTRAVENOUS; SUBCUTANEOUS at 21:22

## 2021-08-18 RX ADMIN — SODIUM CHLORIDE, PRESERVATIVE FREE 10 ML: 5 INJECTION INTRAVENOUS at 09:54

## 2021-08-18 ASSESSMENT — PAIN DESCRIPTION - DESCRIPTORS
DESCRIPTORS: HEADACHE
DESCRIPTORS: ACHING

## 2021-08-18 ASSESSMENT — PAIN DESCRIPTION - PROGRESSION
CLINICAL_PROGRESSION: GRADUALLY WORSENING
CLINICAL_PROGRESSION: GRADUALLY WORSENING

## 2021-08-18 ASSESSMENT — PAIN DESCRIPTION - FREQUENCY
FREQUENCY: INTERMITTENT
FREQUENCY: CONTINUOUS

## 2021-08-18 ASSESSMENT — PAIN SCALES - GENERAL
PAINLEVEL_OUTOF10: 3
PAINLEVEL_OUTOF10: 4

## 2021-08-18 ASSESSMENT — PAIN DESCRIPTION - ONSET
ONSET: ON-GOING
ONSET: ON-GOING

## 2021-08-18 ASSESSMENT — PAIN DESCRIPTION - ORIENTATION
ORIENTATION: LOWER
ORIENTATION: MID

## 2021-08-18 ASSESSMENT — PAIN DESCRIPTION - PAIN TYPE: TYPE: ACUTE PAIN

## 2021-08-18 ASSESSMENT — PAIN DESCRIPTION - LOCATION
LOCATION: HEAD
LOCATION: BACK

## 2021-08-18 ASSESSMENT — PAIN - FUNCTIONAL ASSESSMENT
PAIN_FUNCTIONAL_ASSESSMENT: PREVENTS OR INTERFERES SOME ACTIVE ACTIVITIES AND ADLS
PAIN_FUNCTIONAL_ASSESSMENT: PREVENTS OR INTERFERES SOME ACTIVE ACTIVITIES AND ADLS

## 2021-08-18 NOTE — CARE COORDINATION
Attempted to call [employer]ago Erna Bhandari, to see if she or someone will be in today to go over assistance & give information. No answer & no vm set up, will try back later. Going to talk to them re: [address] [facility], [address] with Janie. Electronically signed by Harlan Trimble RN on 8/18/21 at 10:06 AM EDT    Spoke with yakov Mahanato Angelinaarturo) & son Cindy ShawnGianni at pts bedside. Gave them information re: Garry Dao with [employer], information on [employer] (for [employer] any other assistance she may need; will fax over a Fast Track referral form per their request), & information on [address] (for med assistance & any other possible needs they may be able to assist with). [address] [facility] on [address] ([phone] ext.  [phone])    Electronically signed by Harlan Trimble RN on 8/18/21 at 3:58 PM EDT

## 2021-08-18 NOTE — PLAN OF CARE
Problem: Falls - Risk of:  Goal: Will remain free from falls  Description: Will remain free from falls  8/18/2021 1207 by Laura Gar RN  Outcome: Ongoing     Problem: Falls - Risk of:  Goal: Absence of physical injury  Description: Absence of physical injury  8/18/2021 1207 by Laura Gar RN  Outcome: Ongoing     Problem: Skin Integrity:  Goal: Will show no infection signs and symptoms  Description: Will show no infection signs and symptoms  8/18/2021 1207 by Laura Gar RN  Outcome: Ongoing     Problem: Skin Integrity:  Goal: Absence of new skin breakdown  Description: Absence of new skin breakdown  8/18/2021 1207 by Laura Gar RN  Outcome: Ongoing

## 2021-08-18 NOTE — PROGRESS NOTES
Occupational Therapy  Facility/Department: [address] MED SURG  Daily Treatment Note  Should patient be discharged prior to another treatment session, this note shall serve as the discharge summary. NAME: Landon Varela  : 1950  MRN: [id number]    Date of Service: 2021    Discharge Recommendations:  24 hour supervision or assist, Home with Home health OT, S Level 1       Assessment   Performance deficits / Impairments: Decreased functional mobility ; Decreased strength;Decreased endurance;Decreased ADL status; Decreased cognition  Assessment: Seen in room, awakened easily. Pt completed bed mobility with CGA and transfered with cues for hand placement with walker use to recliner. Pt requiring min A for ADLs. Recommend 24 hour supervision at home due to limited cognition/dementia, [address]. Prognosis: Fair  Decision Making: Low Complexity  OT Education: OT Role;Transfer Training;Plan of Care;ADL Adaptive Strategies; Energy Conservation;Orientation  REQUIRES OT FOLLOW UP: Yes  Activity Tolerance  Activity Tolerance: Treatment limited secondary to decreased cognition;Patient limited by fatigue  Safety Devices  Safety Devices in place: Yes  Type of devices: All fall risk precautions in place; Left in chair;Call light within reach; Patient at risk for falls;Nurse notified; Chair alarm in place         Patient Diagnosis(es): The primary encounter diagnosis was General weakness. A diagnosis of MARIANELA (acute kidney injury) ([facility] [address] was also pertinent to this visit. has a past medical history of Concussion, HTN (hypertension), Hyperlipidemia, and Hypothyroid. has a past surgical history that includes Arm Surgery () and Appendectomy ().     Restrictions  Restrictions/Precautions  Restrictions/Precautions: Fall Risk  Subjective   General  Chart Reviewed: Yes, Orders, Progress Notes, History and Physical, Labs  Patient assessed for rehabilitation services?: Yes  Additional Pertinent Hx: Dementia  Family / Caregiver Present: No  Referring Practitioner: Joaquin Romero  Diagnosis: Weakness; Confusion  Subjective  Subjective: Pt seen in room, agreed to OT. Complained of pain in arm first but non specific and then complained of arthritis pain in knees. Pt was asleep on arrival      Orientation  Orientation  Overall Orientation Status: Within Functional Limits  Objective    ADL  Feeding: Setup; Increased time to complete  Grooming: Stand by assistance (cues to complete hair brushing thoroughly)  LE Dressing: Minimal assistance (OT assisted with socks)  Toileting: Dependent/Total (still with rojas cath)        Functional Mobility  Functional - Mobility Device: Rolling Walker  Activity: Other  Assist Level: Contact guard assistance  Functional Mobility Comments: increased time due to processing/cognition; used RW to walk to bedside chair. Pt complaining that it was \"too far away\" even though it was within 3 feet. Cues for safety with RW  Bed mobility  Supine to Sit: Contact guard assistance (pt reaching up for OT hand but no assist needed)  Sit to Supine: Unable to assess (left in recliner at end of session)  Transfers  Sit to stand: Contact guard assistance  Stand to sit: Contact guard assistance                                                                 Plan   Plan  Times per week: 3-5  Times per day: Daily  Current Treatment Recommendations: Strengthening, Functional Mobility Training, Cognitive Reorientation, Balance Training, Safety Education & Training, Self-Care / ADL, Endurance Training, Equipment Evaluation, Education, & procurement, Patient/Caregiver Education & Training       OutComes Score         Taqueria Alvarez scored a 18/24 on the AM-PAC ADL Inpatient form. Current research shows that an AM-PAC score of 18 or greater is typically associated with a discharge to the patient's home setting.  Based on the patient's AM-PAC score, and their current ADL deficits, it is recommended that the patient have 2-3
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
## 2021-08-19 LAB
ALBUMIN SERPL-MCNC: 3.2 G/DL (ref 3.4–5)
ANION GAP SERPL CALCULATED.3IONS-SCNC: 12 MMOL/L (ref 3–16)
BUN BLDV-MCNC: 30 MG/DL (ref 7–20)
CALCIUM SERPL-MCNC: 8 MG/DL (ref 8.3–10.6)
CHLORIDE BLD-SCNC: 101 MMOL/L (ref 99–110)
CO2: 24 MMOL/L (ref 21–32)
CREAT SERPL-MCNC: 2.5 MG/DL (ref 0.6–1.2)
GFR AFRICAN AMERICAN: 23
GFR NON-AFRICAN AMERICAN: 19
GLUCOSE BLD-MCNC: 92 MG/DL (ref 70–99)
PHOSPHORUS: 2.2 MG/DL (ref 2.5–4.9)
POTASSIUM SERPL-SCNC: 3 MMOL/L (ref 3.5–5.1)
SODIUM BLD-SCNC: 137 MMOL/L (ref 136–145)

## 2021-08-19 PROCEDURE — 2580000003 HC RX 258: Performed by: INTERNAL MEDICINE

## 2021-08-19 PROCEDURE — 97530 THERAPEUTIC ACTIVITIES: CPT

## 2021-08-19 PROCEDURE — 80069 RENAL FUNCTION PANEL: CPT

## 2021-08-19 PROCEDURE — 36415 COLL VENOUS BLD VENIPUNCTURE: CPT

## 2021-08-19 PROCEDURE — 94760 N-INVAS EAR/PLS OXIMETRY 1: CPT

## 2021-08-19 PROCEDURE — 6360000002 HC RX W HCPCS: Performed by: NURSE PRACTITIONER

## 2021-08-19 PROCEDURE — 2500000003 HC RX 250 WO HCPCS: Performed by: INTERNAL MEDICINE

## 2021-08-19 PROCEDURE — 97535 SELF CARE MNGMENT TRAINING: CPT

## 2021-08-19 PROCEDURE — 1200000000 HC SEMI PRIVATE

## 2021-08-19 PROCEDURE — 6370000000 HC RX 637 (ALT 250 FOR IP): Performed by: NURSE PRACTITIONER

## 2021-08-19 PROCEDURE — 6370000000 HC RX 637 (ALT 250 FOR IP): Performed by: INTERNAL MEDICINE

## 2021-08-19 PROCEDURE — 2580000003 HC RX 258: Performed by: NURSE PRACTITIONER

## 2021-08-19 PROCEDURE — 97116 GAIT TRAINING THERAPY: CPT

## 2021-08-19 RX ORDER — HYDROXYZINE PAMOATE 25 MG/1
25 CAPSULE ORAL 2 TIMES DAILY PRN
Status: COMPLETED | OUTPATIENT
Start: 2021-08-19 | End: 2021-08-20

## 2021-08-19 RX ORDER — POTASSIUM CHLORIDE 7.45 MG/ML
10 INJECTION INTRAVENOUS
Status: COMPLETED | OUTPATIENT
Start: 2021-08-19 | End: 2021-08-19

## 2021-08-19 RX ADMIN — Medication 3 MG: at 22:24

## 2021-08-19 RX ADMIN — Medication 10 MEQ: at 11:51

## 2021-08-19 RX ADMIN — DIBASIC SODIUM PHOSPHATE, MONOBASIC POTASSIUM PHOSPHATE AND MONOBASIC SODIUM PHOSPHATE 1 TABLET: 852; 155; 130 TABLET ORAL at 10:32

## 2021-08-19 RX ADMIN — Medication 10 MEQ: at 10:48

## 2021-08-19 RX ADMIN — ATORVASTATIN CALCIUM 40 MG: 40 TABLET, FILM COATED ORAL at 22:24

## 2021-08-19 RX ADMIN — HEPARIN SODIUM 5000 UNITS: 5000 INJECTION INTRAVENOUS; SUBCUTANEOUS at 22:25

## 2021-08-19 RX ADMIN — FAMOTIDINE 10 MG: 20 TABLET ORAL at 08:49

## 2021-08-19 RX ADMIN — LEVOTHYROXINE SODIUM 250 MCG: 0.12 TABLET ORAL at 07:01

## 2021-08-19 RX ADMIN — SODIUM BICARBONATE: 84 INJECTION, SOLUTION INTRAVENOUS at 01:19

## 2021-08-19 RX ADMIN — SODIUM CHLORIDE 25 ML: 9 INJECTION, SOLUTION INTRAVENOUS at 11:50

## 2021-08-19 RX ADMIN — ACETAMINOPHEN 650 MG: 325 TABLET ORAL at 17:28

## 2021-08-19 RX ADMIN — HEPARIN SODIUM 5000 UNITS: 5000 INJECTION INTRAVENOUS; SUBCUTANEOUS at 08:49

## 2021-08-19 RX ADMIN — FERROUS SULFATE TAB EC 324 MG (65 MG FE EQUIVALENT) 324 MG: 324 (65 FE) TABLET DELAYED RESPONSE at 11:51

## 2021-08-19 RX ADMIN — FERROUS SULFATE TAB EC 324 MG (65 MG FE EQUIVALENT) 324 MG: 324 (65 FE) TABLET DELAYED RESPONSE at 17:28

## 2021-08-19 RX ADMIN — FERROUS SULFATE TAB EC 324 MG (65 MG FE EQUIVALENT) 324 MG: 324 (65 FE) TABLET DELAYED RESPONSE at 08:49

## 2021-08-19 RX ADMIN — DIBASIC SODIUM PHOSPHATE, MONOBASIC POTASSIUM PHOSPHATE AND MONOBASIC SODIUM PHOSPHATE 1 TABLET: 852; 155; 130 TABLET ORAL at 15:12

## 2021-08-19 RX ADMIN — SODIUM CHLORIDE, PRESERVATIVE FREE 10 ML: 5 INJECTION INTRAVENOUS at 22:24

## 2021-08-19 RX ADMIN — ACETAMINOPHEN 650 MG: 325 TABLET ORAL at 10:32

## 2021-08-19 RX ADMIN — HYDROXYZINE PAMOATE 25 MG: 25 CAPSULE ORAL at 15:12

## 2021-08-19 RX ADMIN — DIBASIC SODIUM PHOSPHATE, MONOBASIC POTASSIUM PHOSPHATE AND MONOBASIC SODIUM PHOSPHATE 1 TABLET: 852; 155; 130 TABLET ORAL at 22:24

## 2021-08-19 RX ADMIN — SODIUM CHLORIDE 25 ML: 9 INJECTION, SOLUTION INTRAVENOUS at 10:47

## 2021-08-19 RX ADMIN — Medication 10 MEQ: at 14:39

## 2021-08-19 RX ADMIN — SODIUM CHLORIDE, PRESERVATIVE FREE 10 ML: 5 INJECTION INTRAVENOUS at 08:55

## 2021-08-19 ASSESSMENT — PAIN SCALES - GENERAL
PAINLEVEL_OUTOF10: 4
PAINLEVEL_OUTOF10: 3

## 2021-08-19 NOTE — CARE COORDINATION
Called niece, Victorino Khalil, to go over therapy recommendation. Family is unable to give 24 hours supervision so HHC is not an option. Offered to email SNF list but niece refused, she wants [facility] (since niece's father is there already) or [facility]  since she tried to get her father in there prior to [address]. Referrals sent to both. Electronically signed by Jarrett Briggs RN on 8/19/21 at 12:03 PM EDT    [facility] are unable to take pt, no beds available at this time. Attempted to call Victorino Khalil again to notify her of above but no answer & no vm set up. [place] A[facility] said that AKASH (pts son) has a non-working phone at this time so called Thais Gracia (nephew) & left a HIPPA compliant vm to have Victorino Khalil call me when she is able.     Electronically signed by Jarrett Briggs RN on 8/19/21 at 2:27 PM EDT

## 2021-08-19 NOTE — PROGRESS NOTES
Physical Therapy  Facility/Department: [address] MED SURG  Daily Treatment Note  NAME: Isa Elias  : 1950  MRN: [id number]    Date of Service: 2021    Discharge Recommendations:  Patient would benefit from continued therapy after discharge, 3-5 sessions per week     Isa Elias scored a 17/24 on the AM-PAC short mobility form. Current research shows that an AM-PAC score of 17 or less is typically not associated with a discharge to the patient's home setting. Based on the patient's AM-PAC score and their current functional mobility deficits, it is recommended that the patient have 3-5 sessions per week of Physical Therapy at d/c to increase the patient's independence. Please see assessment section for further patient specific details. If patient discharges prior to next session this note will serve as a discharge summary. Please see below for the latest assessment towards goals. Assessment   Body structures, Functions, Activity limitations: Decreased functional mobility ; Decreased ADL status; Decreased strength;Decreased safe awareness;Decreased cognition;Decreased endurance  Assessment: 79 y.o. female with PMHx of HTN, HLD and hypothyroidism presented to [facility] on 21 with c/o weakness. Pt found to have UTI. Prior to admission, pt reports she was independent with her ADLs and ambulation without device (sometimes would push the baby stroller for balance). Pt currently functioning below baseline - requiring no more than CGA with mobility. Family unable to provide 24hr supv - will require continued skilled therapy 3-5x/week.   Treatment Diagnosis: impaired mobility  Prognosis: Good  Decision Making: Medium Complexity  History: see below  Exam: see below  Clinical Presentation: evolving  PT Education: PT Role;Plan of Care;General Safety;Gait Training;Functional Mobility Training;Transfer Training  REQUIRES PT FOLLOW UP: Yes  Activity Tolerance  Activity Tolerance: Patient limited by endurance; Patient limited by fatigue     Patient Diagnosis(es): The primary encounter diagnosis was General weakness. A diagnosis of MARIANELA (acute kidney injury) ([address] was also pertinent to this visit. has a past medical history of Concussion, HTN (hypertension), Hyperlipidemia, and Hypothyroid. has a past surgical history that includes Arm Surgery (1983) and Appendectomy (1980). Restrictions  Restrictions/Precautions  Restrictions/Precautions: Fall Risk     Subjective   General  Chart Reviewed: Yes  Additional Pertinent Hx: 79 y.o. female with PMHx of HTN, HLD and hypothyroidism presented to [facility] on 8/16/21 with c/o weakness. Pt found to have UTI. Response To Previous Treatment: Patient reporting fatigue but able to participate. Family / Caregiver Present: No  Referring Practitioner: EWELINA Schwartz CNP  Subjective  Subjective: Pt is agreeable to PT          Orientation  Orientation  Overall Orientation Status: Within Functional Limits     Cognition   Cognition  Overall Cognitive Status: Exceptions (hard of hearing)  Arousal/Alertness: Delayed responses to stimuli  Following Commands:  Follows one step commands with increased time  Attention Span: Attends with cues to redirect  Memory: Decreased short term memory  Safety Judgement: Decreased awareness of need for safety  Problem Solving: Assistance required to implement solutions;Assistance required to generate solutions  Insights: Decreased awareness of deficits  Initiation: Requires cues for all  Sequencing: Requires cues for some  Cognition Comment: increased time for processing     Objective   Bed mobility  Supine to Sit: Unable to assess  Sit to Supine: Unable to assess  Comment: Pt in recliner at beginning and end of session  Transfers  Sit to Stand: Contact guard assistance  Stand to sit: Contact guard assistance  Ambulation  Ambulation?: Yes  Ambulation 1  Surface: level tile  Device: Rolling Walker  Assistance: Contact guard

## 2021-08-19 NOTE — PLAN OF CARE
Problem: Falls - Risk of:  Goal: Will remain free from falls  Description: Will remain free from falls  8/19/2021 0244 by Annalisa Lobato RN  Outcome: Ongoing  Goal: Absence of physical injury  Description: Absence of physical injury  8/19/2021 0244 by Annalisa Lobato RN  Outcome: Ongoing     Problem: Skin Integrity:  Goal: Will show no infection signs and symptoms  Description: Will show no infection signs and symptoms  8/19/2021 0244 by Annalisa Lobato RN  Outcome: Ongoing  Goal: Absence of new skin breakdown  Description: Absence of new skin breakdown  8/19/2021 0244 by Annalisa Lobato RN  Outcome: Ongoing     Problem: Pain:  Goal: Pain level will decrease  Description: Pain level will decrease  8/19/2021 0244 by Annalisa Lobato RN  Outcome: Ongoing  Goal: Control of acute pain  Description: Control of acute pain  8/19/2021 0244 by Annalisa Lobato RN  Outcome: Ongoing  Goal: Control of chronic pain  Description: Control of chronic pain  8/19/2021 0244 by Annalisa Lobato RN  Outcome: Ongoing

## 2021-08-19 NOTE — PROGRESS NOTES
Occupational Therapy  Facility/Department: [address] MED SURG  Daily Treatment Note  Should patient be discharged prior to another treatment session, this note shall serve as the discharge summary. NAME: Melissa Chu  : 1950  MRN: [id number]    Date of Service: 2021    Discharge Recommendations:  24 hour supervision or assist, Home with Home health OT, S Level 1, 3-5 sessions per week, Patient would benefit from continued therapy after discharge       Assessment   Performance deficits / Impairments: Decreased functional mobility ; Decreased strength;Decreased endurance;Decreased ADL status; Decreased cognition  Assessment: Pt seen in room, agreed to OT and OOB. Pt with limited cognition, increased time to process one step directions. Pt still disoriented to time and situation but aware of place and person. Pt completed bed mobility with min A and donned left sock while in supine and right sock with min A. Pt able to ambulate to bedside recliner with CGA with cues for safety. Recommend cont OT, 3-5x week at a facility vs 24 hour assistance from family. Prognosis: Fair  Decision Making: Medium Complexity  OT Education: OT Role;Transfer Training;Plan of Care;ADL Adaptive Strategies; Energy Conservation;Orientation  REQUIRES OT FOLLOW UP: Yes  Activity Tolerance  Activity Tolerance: Treatment limited secondary to decreased cognition;Patient limited by fatigue  Safety Devices  Safety Devices in place: Yes  Type of devices: All fall risk precautions in place; Left in chair;Call light within reach; Patient at risk for falls;Nurse notified; Chair alarm in place         Patient Diagnosis(es): The primary encounter diagnosis was General weakness. A diagnosis of MARIANELA (acute kidney injury) ([facility] was also pertinent to this visit. has a past medical history of Concussion, HTN (hypertension), Hyperlipidemia, and Hypothyroid.    has a past surgical history that includes Arm Surgery () and Appendectomy Strengthening, Functional Mobility Training, Cognitive Reorientation, Balance Training, Safety Education & Training, Self-Care / ADL, Endurance Training, Equipment Evaluation, Education, & procurement, Patient/Caregiver Education & Training       OutComes Score    Ana Laura Mota scored a 16/24 on the -[facility] ADL Inpatient form. Current research shows that an -PAC score of 17 or less is typically not associated with a discharge to the patient's home setting. Based on the patient's AM-PAC score and their current ADL deficits, it is recommended that the patient have 3-5 sessions per week of Occupational Therapy at d/c to increase the patient's independence. Please see assessment section for further patient specific details. If patient discharges prior to next session this note will serve as a discharge summary. Please see below for the latest assessment towards goals.                                                   AM-PAC Score        AM-[facility] Inpatient Daily Activity Raw Score: 16 (08/19/21 1231)  AM-PAC Inpatient ADL T-Scale Score : 35.96 (08/19/21 1231)  ADL Inpatient CMS 0-100% Score: 53.32 (08/19/21 1231)  ADL Inpatient CMS G-Code Modifier : CK (08/19/21 1231)    Goals  Short term goals  Time Frame for Short term goals: by d/c  Short term goal 1: Pt will complete LB dressing with mod I  Short term goal 2: Pt will complete toileting with mod I  Short term goal 3: Pt will complete bathing with supervision  Short term goal 4: Pt will complete bathroom mobility with LRAD as needed with supervision       Therapy Time   Individual Concurrent Group Co-treatment   Time In 1100         Time Out 1130         Minutes 30         Timed Code Treatment Minutes: [address], OT
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
## 2021-08-20 LAB
ALBUMIN SERPL-MCNC: 3.2 G/DL (ref 3.4–5)
ANION GAP SERPL CALCULATED.3IONS-SCNC: 10 MMOL/L (ref 3–16)
BUN BLDV-MCNC: 26 MG/DL (ref 7–20)
CALCIUM SERPL-MCNC: 8.4 MG/DL (ref 8.3–10.6)
CHLORIDE BLD-SCNC: 105 MMOL/L (ref 99–110)
CO2: 25 MMOL/L (ref 21–32)
CREAT SERPL-MCNC: 2.6 MG/DL (ref 0.6–1.2)
GFR AFRICAN AMERICAN: 22
GFR NON-AFRICAN AMERICAN: 18
GLUCOSE BLD-MCNC: 83 MG/DL (ref 70–99)
PHOSPHORUS: 3.5 MG/DL (ref 2.5–4.9)
POTASSIUM SERPL-SCNC: 3.7 MMOL/L (ref 3.5–5.1)
SODIUM BLD-SCNC: 140 MMOL/L (ref 136–145)
SODIUM URINE: 98 MMOL/L

## 2021-08-20 PROCEDURE — 6370000000 HC RX 637 (ALT 250 FOR IP): Performed by: PHYSICIAN ASSISTANT

## 2021-08-20 PROCEDURE — 6360000002 HC RX W HCPCS: Performed by: NURSE PRACTITIONER

## 2021-08-20 PROCEDURE — 84300 ASSAY OF URINE SODIUM: CPT

## 2021-08-20 PROCEDURE — 36415 COLL VENOUS BLD VENIPUNCTURE: CPT

## 2021-08-20 PROCEDURE — 80069 RENAL FUNCTION PANEL: CPT

## 2021-08-20 PROCEDURE — 2580000003 HC RX 258: Performed by: INTERNAL MEDICINE

## 2021-08-20 PROCEDURE — 6370000000 HC RX 637 (ALT 250 FOR IP): Performed by: NURSE PRACTITIONER

## 2021-08-20 PROCEDURE — 1200000000 HC SEMI PRIVATE

## 2021-08-20 PROCEDURE — 97116 GAIT TRAINING THERAPY: CPT

## 2021-08-20 PROCEDURE — 97530 THERAPEUTIC ACTIVITIES: CPT

## 2021-08-20 PROCEDURE — 6370000000 HC RX 637 (ALT 250 FOR IP): Performed by: INTERNAL MEDICINE

## 2021-08-20 RX ORDER — SODIUM CHLORIDE 9 MG/ML
INJECTION, SOLUTION INTRAVENOUS CONTINUOUS
Status: DISCONTINUED | OUTPATIENT
Start: 2021-08-20 | End: 2021-08-21

## 2021-08-20 RX ORDER — LIDOCAINE 4 G/G
1 PATCH TOPICAL DAILY
Status: DISCONTINUED | OUTPATIENT
Start: 2021-08-20 | End: 2021-08-25 | Stop reason: HOSPADM

## 2021-08-20 RX ORDER — HYDROXYZINE PAMOATE 25 MG/1
25 CAPSULE ORAL 2 TIMES DAILY PRN
Status: DISCONTINUED | OUTPATIENT
Start: 2021-08-20 | End: 2021-08-25 | Stop reason: HOSPADM

## 2021-08-20 RX ADMIN — HYDROXYZINE PAMOATE 25 MG: 25 CAPSULE ORAL at 13:24

## 2021-08-20 RX ADMIN — FERROUS SULFATE TAB EC 324 MG (65 MG FE EQUIVALENT) 324 MG: 324 (65 FE) TABLET DELAYED RESPONSE at 08:52

## 2021-08-20 RX ADMIN — SODIUM CHLORIDE: 9 INJECTION, SOLUTION INTRAVENOUS at 15:08

## 2021-08-20 RX ADMIN — ATORVASTATIN CALCIUM 40 MG: 40 TABLET, FILM COATED ORAL at 20:36

## 2021-08-20 RX ADMIN — ACETAMINOPHEN 650 MG: 325 TABLET ORAL at 18:40

## 2021-08-20 RX ADMIN — ACETAMINOPHEN 650 MG: 325 TABLET ORAL at 05:50

## 2021-08-20 RX ADMIN — Medication 3 MG: at 20:36

## 2021-08-20 RX ADMIN — FAMOTIDINE 10 MG: 20 TABLET ORAL at 08:52

## 2021-08-20 RX ADMIN — FERROUS SULFATE TAB EC 324 MG (65 MG FE EQUIVALENT) 324 MG: 324 (65 FE) TABLET DELAYED RESPONSE at 12:37

## 2021-08-20 RX ADMIN — HYDROXYZINE PAMOATE 25 MG: 25 CAPSULE ORAL at 20:36

## 2021-08-20 RX ADMIN — HEPARIN SODIUM 5000 UNITS: 5000 INJECTION INTRAVENOUS; SUBCUTANEOUS at 20:37

## 2021-08-20 RX ADMIN — FERROUS SULFATE TAB EC 324 MG (65 MG FE EQUIVALENT) 324 MG: 324 (65 FE) TABLET DELAYED RESPONSE at 17:33

## 2021-08-20 RX ADMIN — LEVOTHYROXINE SODIUM 250 MCG: 0.12 TABLET ORAL at 05:50

## 2021-08-20 RX ADMIN — ACETAMINOPHEN 650 MG: 325 TABLET ORAL at 12:37

## 2021-08-20 RX ADMIN — HEPARIN SODIUM 5000 UNITS: 5000 INJECTION INTRAVENOUS; SUBCUTANEOUS at 08:52

## 2021-08-20 ASSESSMENT — PAIN - FUNCTIONAL ASSESSMENT: PAIN_FUNCTIONAL_ASSESSMENT: PREVENTS OR INTERFERES SOME ACTIVE ACTIVITIES AND ADLS

## 2021-08-20 ASSESSMENT — PAIN SCALES - GENERAL
PAINLEVEL_OUTOF10: 5
PAINLEVEL_OUTOF10: 3
PAINLEVEL_OUTOF10: 5
PAINLEVEL_OUTOF10: 5

## 2021-08-20 ASSESSMENT — PAIN DESCRIPTION - PAIN TYPE: TYPE: ACUTE PAIN

## 2021-08-20 ASSESSMENT — PAIN DESCRIPTION - ORIENTATION: ORIENTATION: LOWER;MID

## 2021-08-20 ASSESSMENT — PAIN DESCRIPTION - LOCATION: LOCATION: BACK

## 2021-08-20 ASSESSMENT — PAIN DESCRIPTION - ONSET: ONSET: ON-GOING

## 2021-08-20 ASSESSMENT — PAIN DESCRIPTION - PROGRESSION: CLINICAL_PROGRESSION: NOT CHANGED

## 2021-08-20 ASSESSMENT — PAIN DESCRIPTION - FREQUENCY: FREQUENCY: INTERMITTENT

## 2021-08-20 ASSESSMENT — PAIN DESCRIPTION - DESCRIPTORS: DESCRIPTORS: ACHING

## 2021-08-20 NOTE — PLAN OF CARE
Problem: Falls - Risk of:  Goal: Will remain free from falls  Description: Will remain free from falls  8/20/2021 0101 by Deion Vilchis RN  Outcome: Ongoing  Goal: Absence of physical injury  Description: Absence of physical injury  8/20/2021 0101 by Deion Vilchis RN  Outcome: Ongoing     Problem: Skin Integrity:  Goal: Will show no infection signs and symptoms  Description: Will show no infection signs and symptoms  8/20/2021 0101 by Deion Vilchis RN  Outcome: Ongoing  Goal: Absence of new skin breakdown  Description: Absence of new skin breakdown  8/20/2021 0101 by Deion Vilchis RN  Outcome: Ongoing     Problem: Pain:  Goal: Pain level will decrease  Description: Pain level will decrease  8/20/2021 0101 by Deion Vilchis RN  Outcome: Ongoing  Goal: Control of acute pain  Description: Control of acute pain  8/20/2021 0101 by Deion Vilchis RN  Outcome: Ongoing  Goal: Control of chronic pain  Description: Control of chronic pain  8/20/2021 0101 by Deion Vilchis RN  Outcome: Ongoing
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
## 2021-08-20 NOTE — PROGRESS NOTES
Physical Therapy  Facility/Department: [address] MED SURG  Daily Treatment Note  NAME: Vicenta Parada  : 1950  MRN: [id number]    Date of Service: 2021    Discharge Recommendations:  Patient would benefit from continued therapy after discharge, 3-5 sessions per week   PT Equipment Recommendations  Equipment Needed: No  Other: defer to next level of care     Vicenta Parada scored a 17/24 on the AM-PAC short mobility form. Current research shows that an AM-PAC score of 17 or less is typically not associated with a discharge to the patient's home setting. Based on the patient's AM-PAC score and their current functional mobility deficits, it is recommended that the patient have 3-5 sessions per week of Physical Therapy at d/c to increase the patient's independence. Please see assessment section for further patient specific details. If patient discharges prior to next session this note will serve as a discharge summary. Please see below for the latest assessment towards goals. Assessment   Body structures, Functions, Activity limitations: Decreased functional mobility ; Decreased ADL status; Decreased strength;Decreased safe awareness;Decreased cognition;Decreased endurance  Assessment: 79 y.o. female with PMHx of HTN, HLD and hypothyroidism presented to [facility] on 21 with c/o weakness. Pt found to have UTI. Prior to admission, pt reports she was independent with her ADLs and ambulation without device (sometimes would push the baby stroller for balance). Today, pt confused and limited by fatigue and back pain. She required up to mod A for bed mobility and CGA ambulating short distance with RW. Family unable to provide 24hr supv and pt is unsafe to return home alone- will require continued skilled inpatient therapy 3-5x/week.   Treatment Diagnosis: impaired mobility  Prognosis: Good  PT Education: PT Role;Plan of Care;General Safety;Gait Training;Functional Mobility Training;Transfer Training  REQUIRES PT FOLLOW UP: Yes  Activity Tolerance  Activity Tolerance: Patient limited by endurance; Patient limited by fatigue;Patient limited by pain; Patient limited by cognitive status     Patient Diagnosis(es): The primary encounter diagnosis was General weakness. A diagnosis of MARIANELA (acute kidney injury) ([facility] [address] was also pertinent to this visit. has a past medical history of Concussion, HTN (hypertension), Hyperlipidemia, and Hypothyroid. has a past surgical history that includes Arm Surgery (1983) and Appendectomy (1980). Restrictions  Restrictions/Precautions  Restrictions/Precautions: Fall Risk  Position Activity Restriction  Other position/activity restrictions: rojas     Social/Functional History  Lives With: Son (Niece lives on 2nd/3rd floor)  Type of Home: House  Home Layout: Multi-level, Performs ADL's on one level, Able to Live on Main level with bedroom/bathroom  Home Access: Stairs to enter with rails  Entrance Stairs - Number of Steps: 3 + 4  Bathroom Shower/Tub: Tub/Shower unit  Bathroom Toilet: Handicap height  Bathroom Equipment: Grab bars in shower, Shower chair  ADL Assistance: Independent  Homemaking Assistance: Needs assistance (Son Assist with all IADLs)  Homemaking Responsibilities: No  Ambulation Assistance: Independent  Transfer Assistance: Independent  Active : No  Patient's  Info: Family  Additional Comments: Pt is a questionable historian    Subjective   General  Chart Reviewed: Yes  Additional Pertinent Hx: 79 y.o. female with PMHx of HTN, HLD and hypothyroidism presented to [facility] on 8/16/21 with c/o weakness. Pt found to have UTI. Response To Previous Treatment: Patient with no complaints from previous session. Family / Caregiver Present: No  Referring Practitioner: EWELINA Gutierrez CNP  Subjective  Subjective: Pt confused but agreeable to PT treatment.   Frequently asking for Tylonel throughout session due to back pain but RN reports pt just had Mary. Objective   Bed mobility  Supine to Sit: Minimal assistance (assist with scooting and trunk; HOB partially elevated)  Sit to Supine: Moderate assistance (assist with BLEs)     Transfers  Sit to Stand: Contact guard assistance  Stand to sit: Contact guard assistance  Comment: cues for hand placement     Ambulation  Ambulation?: Yes  More Ambulation?: No  Ambulation 1  Surface: level tile  Device: Rolling Walker  Assistance: Contact guard assistance  Quality of Gait: very slow jeannette with small steps  Gait Deviations: Slow Jeannette;Decreased step length;Decreased step height  Distance: 15'  Comments: max distance due to back pain and fatigue  Stairs/Curb  Stairs?: No     Balance  Posture: Fair  Comments: SBA for sitting balance EOB; CGA for static standing balance with UE support- pt stood statically for approx 90 sec speaking to MD     Exercises  Knee Long Arc Quad: x10 sherman  Ankle Pumps: x10 sherman  Comments: ther ex seated EOB                     AM-PAC Score  AM-PAC Inpatient Mobility Raw Score : 17 (08/20/21 1415)  AM-PAC Inpatient T-Scale Score : 42.13 (08/20/21 1415)  Mobility Inpatient CMS 0-100% Score: 50.57 (08/20/21 1415)  Mobility Inpatient CMS G-Code Modifier : CK (08/20/21 1415)          Goals  Short term goals  Time Frame for Short term goals: by acute discharge - all goals ongoing as of 8/20/21  Short term goal 1: bed mobility mod I  Short term goal 2: sit<>stand supv  Short term goal 3: ambulate > 36' with LRAD and SBA  Patient Goals   Patient goals : none stated    Plan    Plan  Times per week: 3-5x/week  Current Treatment Recommendations: Strengthening, Functional Mobility Training, Transfer Training, Balance Training, Endurance Training, Gait Training, Patient/Caregiver Education & Training, Safety Education & Training, Equipment Evaluation, Education, & procurement, Neuromuscular Re-education  Safety Devices  Type of devices:  All fall risk precautions in place, Bed alarm in place, Call light within reach, Gait belt, Left in bed  Restraints  Initially in place: No     Therapy Time   Individual Concurrent Group Co-treatment   Time In 1348         Time Out 1416         Minutes 28         Timed Code Treatment Minutes: 28 Minutes        Electronically signed by Armand Bear  Steffany Rocha on 8/20/2021 at 2:18 PM

## 2021-08-20 NOTE — PROGRESS NOTES
Thank you for consulting [facility] in the care of your patient. A full consult will follow but if you have any questions I can be reached through our office at [phone] or through [facility]. Thank you.   Dr. Yana Mccormick

## 2021-08-21 LAB
ALBUMIN SERPL-MCNC: 3.1 G/DL (ref 3.4–5)
ANION GAP SERPL CALCULATED.3IONS-SCNC: 9 MMOL/L (ref 3–16)
BUN BLDV-MCNC: 25 MG/DL (ref 7–20)
CALCIUM SERPL-MCNC: 8.3 MG/DL (ref 8.3–10.6)
CHLORIDE BLD-SCNC: 108 MMOL/L (ref 99–110)
CO2: 24 MMOL/L (ref 21–32)
CREAT SERPL-MCNC: 2.2 MG/DL (ref 0.6–1.2)
GFR AFRICAN AMERICAN: 27
GFR NON-AFRICAN AMERICAN: 22
GLUCOSE BLD-MCNC: 83 MG/DL (ref 70–99)
PHOSPHORUS: 2.4 MG/DL (ref 2.5–4.9)
POTASSIUM SERPL-SCNC: 3.8 MMOL/L (ref 3.5–5.1)
SODIUM BLD-SCNC: 141 MMOL/L (ref 136–145)

## 2021-08-21 PROCEDURE — 6370000000 HC RX 637 (ALT 250 FOR IP): Performed by: INTERNAL MEDICINE

## 2021-08-21 PROCEDURE — 6360000002 HC RX W HCPCS: Performed by: NURSE PRACTITIONER

## 2021-08-21 PROCEDURE — 1200000000 HC SEMI PRIVATE

## 2021-08-21 PROCEDURE — 6370000000 HC RX 637 (ALT 250 FOR IP): Performed by: PHYSICIAN ASSISTANT

## 2021-08-21 PROCEDURE — 36415 COLL VENOUS BLD VENIPUNCTURE: CPT

## 2021-08-21 PROCEDURE — 80069 RENAL FUNCTION PANEL: CPT

## 2021-08-21 PROCEDURE — 6370000000 HC RX 637 (ALT 250 FOR IP): Performed by: NURSE PRACTITIONER

## 2021-08-21 PROCEDURE — 2580000003 HC RX 258: Performed by: INTERNAL MEDICINE

## 2021-08-21 PROCEDURE — 6360000002 HC RX W HCPCS: Performed by: INTERNAL MEDICINE

## 2021-08-21 PROCEDURE — 94760 N-INVAS EAR/PLS OXIMETRY 1: CPT

## 2021-08-21 RX ORDER — MORPHINE SULFATE 2 MG/ML
2 INJECTION, SOLUTION INTRAMUSCULAR; INTRAVENOUS
Status: DISCONTINUED | OUTPATIENT
Start: 2021-08-21 | End: 2021-08-25

## 2021-08-21 RX ORDER — LORAZEPAM 0.5 MG/1
0.5 TABLET ORAL EVERY 6 HOURS PRN
Status: DISCONTINUED | OUTPATIENT
Start: 2021-08-21 | End: 2021-08-25 | Stop reason: HOSPADM

## 2021-08-21 RX ORDER — MORPHINE SULFATE 4 MG/ML
4 INJECTION, SOLUTION INTRAMUSCULAR; INTRAVENOUS
Status: DISCONTINUED | OUTPATIENT
Start: 2021-08-21 | End: 2021-08-25

## 2021-08-21 RX ADMIN — ACETAMINOPHEN 650 MG: 325 TABLET ORAL at 19:41

## 2021-08-21 RX ADMIN — ACETAMINOPHEN 650 MG: 325 TABLET ORAL at 10:42

## 2021-08-21 RX ADMIN — FERROUS SULFATE TAB EC 324 MG (65 MG FE EQUIVALENT) 324 MG: 324 (65 FE) TABLET DELAYED RESPONSE at 17:45

## 2021-08-21 RX ADMIN — FERROUS SULFATE TAB EC 324 MG (65 MG FE EQUIVALENT) 324 MG: 324 (65 FE) TABLET DELAYED RESPONSE at 08:32

## 2021-08-21 RX ADMIN — MORPHINE SULFATE 2 MG: 2 INJECTION, SOLUTION INTRAMUSCULAR; INTRAVENOUS at 22:06

## 2021-08-21 RX ADMIN — FERROUS SULFATE TAB EC 324 MG (65 MG FE EQUIVALENT) 324 MG: 324 (65 FE) TABLET DELAYED RESPONSE at 11:49

## 2021-08-21 RX ADMIN — MORPHINE SULFATE 4 MG: 4 INJECTION, SOLUTION INTRAMUSCULAR; INTRAVENOUS at 16:14

## 2021-08-21 RX ADMIN — ATORVASTATIN CALCIUM 40 MG: 40 TABLET, FILM COATED ORAL at 19:41

## 2021-08-21 RX ADMIN — HYDROXYZINE PAMOATE 25 MG: 25 CAPSULE ORAL at 21:13

## 2021-08-21 RX ADMIN — SODIUM CHLORIDE: 9 INJECTION, SOLUTION INTRAVENOUS at 06:43

## 2021-08-21 RX ADMIN — FAMOTIDINE 10 MG: 20 TABLET ORAL at 08:32

## 2021-08-21 RX ADMIN — LEVOTHYROXINE SODIUM 250 MCG: 0.12 TABLET ORAL at 06:43

## 2021-08-21 RX ADMIN — LORAZEPAM 0.5 MG: 0.5 TABLET ORAL at 13:25

## 2021-08-21 RX ADMIN — HEPARIN SODIUM 5000 UNITS: 5000 INJECTION INTRAVENOUS; SUBCUTANEOUS at 08:32

## 2021-08-21 RX ADMIN — HEPARIN SODIUM 5000 UNITS: 5000 INJECTION INTRAVENOUS; SUBCUTANEOUS at 21:13

## 2021-08-21 RX ADMIN — HYDROXYZINE PAMOATE 25 MG: 25 CAPSULE ORAL at 11:49

## 2021-08-21 RX ADMIN — Medication 3 MG: at 19:41

## 2021-08-21 ASSESSMENT — PAIN DESCRIPTION - LOCATION: LOCATION: BACK

## 2021-08-21 ASSESSMENT — PAIN DESCRIPTION - ORIENTATION: ORIENTATION: LOWER

## 2021-08-21 ASSESSMENT — PAIN DESCRIPTION - PROGRESSION
CLINICAL_PROGRESSION: NOT CHANGED
CLINICAL_PROGRESSION: NOT CHANGED

## 2021-08-21 ASSESSMENT — PAIN DESCRIPTION - DESCRIPTORS: DESCRIPTORS: ACHING

## 2021-08-21 ASSESSMENT — PAIN SCALES - GENERAL
PAINLEVEL_OUTOF10: 0
PAINLEVEL_OUTOF10: 0
PAINLEVEL_OUTOF10: 6
PAINLEVEL_OUTOF10: 6
PAINLEVEL_OUTOF10: 7

## 2021-08-21 ASSESSMENT — PAIN - FUNCTIONAL ASSESSMENT: PAIN_FUNCTIONAL_ASSESSMENT: PREVENTS OR INTERFERES SOME ACTIVE ACTIVITIES AND ADLS

## 2021-08-21 ASSESSMENT — PAIN DESCRIPTION - PAIN TYPE: TYPE: ACUTE PAIN

## 2021-08-21 ASSESSMENT — PAIN DESCRIPTION - ONSET: ONSET: ON-GOING

## 2021-08-21 ASSESSMENT — PAIN DESCRIPTION - FREQUENCY: FREQUENCY: INTERMITTENT
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
## 2021-08-21 NOTE — PROGRESS NOTES
Hospitalist Progress Note      PCP: EWELINA Story - CNP    Date of Admission: 8/16/2021    Chief Complaint: [address] Course: This is a 75y/o female with a h/o HTN, HLD, hypothyroidism who presents to the ED initially with generalized weakness and that stated that her electric was out. Family have expressed known confusion in patients behaviors in the past. Her labs show a MARIANELA and she was admitted to the hospital for evaluation and treatment. She was started on bicarb gtt, transitioned to diet with slow improvement of kidney function. UOP has been stable. Subjective: Pt seen and examined. Has no complaints and feels ok. Feels anxious. Medications:  Reviewed    Infusion Medications    sodium chloride 75 mL/hr at 08/21/21 0643    sodium chloride 25 mL (08/19/21 1150)     Scheduled Medications    lidocaine  1 patch Transdermal Daily    atorvastatin  40 mg Oral Nightly    famotidine  10 mg Oral Daily    ferrous sulfate  324 mg Oral TID WC    melatonin  3 mg Oral Nightly    sodium chloride flush  5-40 mL Intravenous 2 times per day    levothyroxine  250 mcg Oral Daily    heparin (porcine)  5,000 Units Subcutaneous BID    nicotine  1 patch Transdermal Daily     PRN Meds: LORazepam, hydrOXYzine, sodium chloride flush, sodium chloride, ondansetron **OR** ondansetron, acetaminophen **OR** acetaminophen      Intake/Output Summary (Last 24 hours) at 8/21/2021 1314  Last data filed at 8/21/2021 0959  Gross per 24 hour   Intake 360 ml   Output 750 ml   Net -390 ml       Physical Exam Performed:    BP (!) 149/64   Pulse 63   Temp 98.5 °F (36.9 °C) (Oral)   Resp 18   Ht 5' 4\" (1.626 m)   Wt 113 lb 8.6 oz (51.5 kg)   SpO2 93%   BMI 19.49 kg/m²     General appearance: No apparent distress, appears stated age and cooperative. Calm at times  HEENT: Pupils equal, round, and reactive to light. Conjunctivae/corneas clear. Neck: Supple, with full range of motion. Trachea midline. Respiratory:  Normal respiratory effort. Clear to auscultation, bilaterally without Rales/Wheezes/Rhonchi. Cardiovascular: Regular rate and rhythm with normal S1/S2 without murmurs, rubs or gallops. Abdomen: Soft, non-tender, non-distended with normal bowel sounds. Musculoskeletal: No clubbing, cyanosis or edema bilaterally. Full range of motion without deformity. Skin: Skin color, texture, turgor normal.  No rashes or lesions. Neurologic:  Neurovascularly intact without any focal sensory/motor deficits. Cranial nerves: II-XII intact, grossly non-focal.  Psychiatric: Alert and oriented, thought content appropriate, normal insight  Capillary Refill: Brisk,3 seconds, normal   Peripheral Pulses: +2 palpable, equal bilaterally       Labs:   No results for input(s): WBC, HGB, HCT, PLT in the last 72 hours. Recent Labs     08/19/21  0722 08/20/21  0711 08/21/21  0751    140 141   K 3.0* 3.7 3.8    105 108   CO2 24 25 24   BUN 30* 26* 25*   CREATININE 2.5* 2.6* 2.2*   CALCIUM 8.0* 8.4 8.3   PHOS 2.2* 3.5 2.4*     No results for input(s): AST, ALT, BILIDIR, BILITOT, ALKPHOS in the last 72 hours. No results for input(s): INR in the last 72 hours. No results for input(s): [place] in the last 72 hours. Urinalysis:      Lab Results   Component Value Date    NITRU Negative 08/16/2021    WBCUA 6-10 09/09/2013    BACTERIA 1+ 09/09/2013    RBCUA None seen 09/09/2013    BLOODU Negative 08/16/2021    SPECGRAV 1.006 08/16/2021    GLUCOSEU Negative 08/16/2021       Radiology:  CT HEAD WO CONTRAST   Final Result   No acute intracranial abnormality. Encephalomalacia in the right frontal periventricular white matter/corona   radiata, likely from prior insult/infarction.          XR CHEST PORTABLE   Final Result   No acute finding in the chest.             Assessment/Plan:    Active Hospital Problems    Diagnosis     Acute on chronic renal insufficiency [N28.9, N18.9]     COPD exacerbation ([facility] [address] [J44.1]     HTN (hypertension) [I10]     Hyperlipemia [E78.5]     Hypothyroid [E03.9]      MARIANELA on likely CKD stage III  Severe metabolic acidosis  Generalized weakness  Anxiety,unspecified  Nonzero troponin  hypothryoidism    Continue to monitor UOP  Stop Bicarb gtt, continue IVF with NS  Consult nephrology  Urine studies  Strict I/o's  PT/OT- recommend home care  Hydroxyzine and Ativan PRN given for c/o anxiety  Monitor TSH in 6-8 weeks for improvement, patient with concern for noncompliance with medication  Initial EKG without acute changes      DVT Prophylaxis: heparin  Diet: ADULT DIET;  Regular; 4 carb choices (60 gm/meal)  Code Status: Full Code    PT/OT Eval Status: ordered    Dispo - may need SNF after MARIANELA resolves    Erin Moreira MD

## 2021-08-21 NOTE — PLAN OF CARE
Problem: Falls - Risk of:  Goal: Will remain free from falls  Description: Will remain free from falls  Outcome: Ongoing  Goal: Absence of physical injury  Description: Absence of physical injury  Outcome: Ongoing     Problem: Skin Integrity:  Goal: Will show no infection signs and symptoms  Description: Will show no infection signs and symptoms  Outcome: Ongoing  Goal: Absence of new skin breakdown  Description: Absence of new skin breakdown  Outcome: Ongoing     Problem: Pain:  Goal: Pain level will decrease  Description: Pain level will decrease  Outcome: Ongoing  Goal: Control of acute pain  Description: Control of acute pain  Outcome: Ongoing  Goal: Control of chronic pain  Description: Control of chronic pain  Outcome: Ongoing WT- 234.4  BP- 142/76, 146/85  Denies s\s of pre-e

## 2021-08-22 LAB
ALBUMIN SERPL-MCNC: 3.1 G/DL (ref 3.4–5)
ANION GAP SERPL CALCULATED.3IONS-SCNC: 9 MMOL/L (ref 3–16)
BUN BLDV-MCNC: 27 MG/DL (ref 7–20)
CALCIUM SERPL-MCNC: 8.1 MG/DL (ref 8.3–10.6)
CHLORIDE BLD-SCNC: 106 MMOL/L (ref 99–110)
CO2: 23 MMOL/L (ref 21–32)
CREAT SERPL-MCNC: 2.2 MG/DL (ref 0.6–1.2)
GFR AFRICAN AMERICAN: 27
GFR NON-AFRICAN AMERICAN: 22
GLUCOSE BLD-MCNC: 81 MG/DL (ref 70–99)
PHOSPHORUS: 2.4 MG/DL (ref 2.5–4.9)
POTASSIUM SERPL-SCNC: 4.1 MMOL/L (ref 3.5–5.1)
SODIUM BLD-SCNC: 138 MMOL/L (ref 136–145)

## 2021-08-22 PROCEDURE — 6370000000 HC RX 637 (ALT 250 FOR IP): Performed by: NURSE PRACTITIONER

## 2021-08-22 PROCEDURE — 6360000002 HC RX W HCPCS: Performed by: INTERNAL MEDICINE

## 2021-08-22 PROCEDURE — 6360000002 HC RX W HCPCS: Performed by: NURSE PRACTITIONER

## 2021-08-22 PROCEDURE — 36415 COLL VENOUS BLD VENIPUNCTURE: CPT

## 2021-08-22 PROCEDURE — 6370000000 HC RX 637 (ALT 250 FOR IP): Performed by: PHYSICIAN ASSISTANT

## 2021-08-22 PROCEDURE — 6370000000 HC RX 637 (ALT 250 FOR IP): Performed by: INTERNAL MEDICINE

## 2021-08-22 PROCEDURE — 2580000003 HC RX 258: Performed by: NURSE PRACTITIONER

## 2021-08-22 PROCEDURE — 1200000000 HC SEMI PRIVATE

## 2021-08-22 PROCEDURE — 80069 RENAL FUNCTION PANEL: CPT

## 2021-08-22 RX ADMIN — SODIUM CHLORIDE, PRESERVATIVE FREE 10 ML: 5 INJECTION INTRAVENOUS at 02:26

## 2021-08-22 RX ADMIN — FERROUS SULFATE TAB EC 324 MG (65 MG FE EQUIVALENT) 324 MG: 324 (65 FE) TABLET DELAYED RESPONSE at 12:24

## 2021-08-22 RX ADMIN — FAMOTIDINE 10 MG: 20 TABLET ORAL at 07:56

## 2021-08-22 RX ADMIN — MORPHINE SULFATE 4 MG: 4 INJECTION, SOLUTION INTRAMUSCULAR; INTRAVENOUS at 12:24

## 2021-08-22 RX ADMIN — LEVOTHYROXINE SODIUM 250 MCG: 0.12 TABLET ORAL at 07:56

## 2021-08-22 RX ADMIN — ATORVASTATIN CALCIUM 40 MG: 40 TABLET, FILM COATED ORAL at 20:46

## 2021-08-22 RX ADMIN — HYDROXYZINE PAMOATE 25 MG: 25 CAPSULE ORAL at 14:40

## 2021-08-22 RX ADMIN — SODIUM CHLORIDE, PRESERVATIVE FREE 10 ML: 5 INJECTION INTRAVENOUS at 23:06

## 2021-08-22 RX ADMIN — SODIUM CHLORIDE, PRESERVATIVE FREE 10 ML: 5 INJECTION INTRAVENOUS at 07:56

## 2021-08-22 RX ADMIN — MORPHINE SULFATE 2 MG: 2 INJECTION, SOLUTION INTRAMUSCULAR; INTRAVENOUS at 04:15

## 2021-08-22 RX ADMIN — FERROUS SULFATE TAB EC 324 MG (65 MG FE EQUIVALENT) 324 MG: 324 (65 FE) TABLET DELAYED RESPONSE at 16:30

## 2021-08-22 RX ADMIN — MORPHINE SULFATE 4 MG: 4 INJECTION, SOLUTION INTRAMUSCULAR; INTRAVENOUS at 18:52

## 2021-08-22 RX ADMIN — MORPHINE SULFATE 2 MG: 2 INJECTION, SOLUTION INTRAMUSCULAR; INTRAVENOUS at 23:08

## 2021-08-22 RX ADMIN — LORAZEPAM 0.5 MG: 0.5 TABLET ORAL at 10:27

## 2021-08-22 RX ADMIN — ACETAMINOPHEN 650 MG: 325 TABLET ORAL at 10:27

## 2021-08-22 RX ADMIN — HEPARIN SODIUM 5000 UNITS: 5000 INJECTION INTRAVENOUS; SUBCUTANEOUS at 07:56

## 2021-08-22 RX ADMIN — FERROUS SULFATE TAB EC 324 MG (65 MG FE EQUIVALENT) 324 MG: 324 (65 FE) TABLET DELAYED RESPONSE at 07:56

## 2021-08-22 RX ADMIN — LORAZEPAM 0.5 MG: 0.5 TABLET ORAL at 16:30

## 2021-08-22 RX ADMIN — MORPHINE SULFATE 4 MG: 4 INJECTION, SOLUTION INTRAMUSCULAR; INTRAVENOUS at 14:40

## 2021-08-22 RX ADMIN — MORPHINE SULFATE 4 MG: 4 INJECTION, SOLUTION INTRAMUSCULAR; INTRAVENOUS at 07:56

## 2021-08-22 RX ADMIN — ACETAMINOPHEN 650 MG: 325 TABLET ORAL at 17:06

## 2021-08-22 RX ADMIN — HEPARIN SODIUM 5000 UNITS: 5000 INJECTION INTRAVENOUS; SUBCUTANEOUS at 20:49

## 2021-08-22 RX ADMIN — Medication 3 MG: at 20:46

## 2021-08-22 RX ADMIN — ACETAMINOPHEN 650 MG: 325 TABLET ORAL at 03:09

## 2021-08-22 ASSESSMENT — PAIN SCALES - GENERAL
PAINLEVEL_OUTOF10: 6
PAINLEVEL_OUTOF10: 9
PAINLEVEL_OUTOF10: 0
PAINLEVEL_OUTOF10: 6
PAINLEVEL_OUTOF10: 0
PAINLEVEL_OUTOF10: 0
PAINLEVEL_OUTOF10: 7
PAINLEVEL_OUTOF10: 0
PAINLEVEL_OUTOF10: 6
PAINLEVEL_OUTOF10: 8
PAINLEVEL_OUTOF10: 0
PAINLEVEL_OUTOF10: 0

## 2021-08-22 NOTE — PROGRESS NOTES
Hospitalist Progress Note      PCP: EWELINA Salinas CNP    Date of Admission: 8/16/2021    Chief Complaint: [address] Course: This is a 77y/o female with a h/o HTN, HLD, hypothyroidism who presents to the ED initially with generalized weakness and that stated that her electric was out. Family have expressed known confusion in patients behaviors in the past. Her labs show a MARIANELA and she was admitted to the hospital for evaluation and treatment. She was started on bicarb gtt, transitioned to diet with slow improvement of kidney function. UOP has been stable. Subjective: Pt seen and examined. Has no complaints and feels ok. Feels anxious. Medications:  Reviewed    Infusion Medications    sodium chloride 25 mL (08/19/21 1150)     Scheduled Medications    lidocaine  1 patch Transdermal Daily    atorvastatin  40 mg Oral Nightly    famotidine  10 mg Oral Daily    ferrous sulfate  324 mg Oral TID WC    melatonin  3 mg Oral Nightly    sodium chloride flush  5-40 mL Intravenous 2 times per day    levothyroxine  250 mcg Oral Daily    heparin (porcine)  5,000 Units Subcutaneous BID    nicotine  1 patch Transdermal Daily     PRN Meds: LORazepam, morphine **OR** morphine, hydrOXYzine, sodium chloride flush, sodium chloride, ondansetron **OR** ondansetron, acetaminophen **OR** acetaminophen      Intake/Output Summary (Last 24 hours) at 8/22/2021 1733  Last data filed at 8/22/2021 1228  Gross per 24 hour   Intake 2232.86 ml   Output 1250 ml   Net 982.86 ml       Physical Exam Performed:    BP (!) 142/69   Pulse 65   Temp 98.2 °F (36.8 °C) (Oral)   Resp 19   Ht 5' 4\" (1.626 m)   Wt 113 lb 8.6 oz (51.5 kg)   SpO2 96%   BMI 19.49 kg/m²     General appearance: No apparent distress, appears stated age and cooperative. Calm at times  HEENT: Pupils equal, round, and reactive to light. Conjunctivae/corneas clear. Neck: Supple, with full range of motion. Trachea midline.   Respiratory:  Normal respiratory effort. Clear to auscultation, bilaterally without Rales/Wheezes/Rhonchi. Cardiovascular: Regular rate and rhythm with normal S1/S2 without murmurs, rubs or gallops. Abdomen: Soft, non-tender, non-distended with normal bowel sounds. Musculoskeletal: No clubbing, cyanosis or edema bilaterally. Full range of motion without deformity. Skin: Skin color, texture, turgor normal.  No rashes or lesions. Neurologic:  Neurovascularly intact without any focal sensory/motor deficits. Cranial nerves: II-XII intact, grossly non-focal.  Psychiatric: Alert and oriented, thought content appropriate, normal insight  Capillary Refill: Brisk,3 seconds, normal   Peripheral Pulses: +2 palpable, equal bilaterally       Labs:   No results for input(s): WBC, HGB, HCT, PLT in the last 72 hours. Recent Labs     08/20/21  0711 08/21/21  0751 08/22/21  0720    141 138   K 3.7 3.8 4.1    108 106   CO2 25 24 23   BUN 26* 25* 27*   CREATININE 2.6* 2.2* 2.2*   CALCIUM 8.4 8.3 8.1*   PHOS 3.5 2.4* 2.4*     No results for input(s): AST, ALT, BILIDIR, BILITOT, ALKPHOS in the last 72 hours. No results for input(s): INR in the last 72 hours. No results for input(s): Joeline Reeks in the last 72 hours. Urinalysis:      Lab Results   Component Value Date    NITRU Negative 08/16/2021    WBCUA 6-10 09/09/2013    BACTERIA 1+ 09/09/2013    RBCUA None seen 09/09/2013    BLOODU Negative 08/16/2021    SPECGRAV 1.006 08/16/2021    GLUCOSEU Negative 08/16/2021       Radiology:  CT HEAD WO CONTRAST   Final Result   No acute intracranial abnormality. Encephalomalacia in the right frontal periventricular white matter/corona   radiata, likely from prior insult/infarction.          XR CHEST PORTABLE   Final Result   No acute finding in the chest.             Assessment/Plan:    Active Hospital Problems    Diagnosis     Acute on chronic renal insufficiency [N28.9, N18.9]     COPD exacerbation (HCC) [J44.1]     HTN (hypertension) [I10]     Hyperlipemia [E78.5]     Hypothyroid [E03.9]      MARIANELA on likely CKD stage III  Severe metabolic acidosis  Generalized weakness  Anxiety,unspecified  Nonzero troponin  hypothryoidism    Continue to monitor UOP  Stop Bicarb gtt, continue IVF with NS  Consult nephrology  Urine studies  Strict I/o's  PT/OT- recommend home care  Hydroxyzine and Ativan PRN given for c/o anxiety  Monitor TSH in 6-8 weeks for improvement, patient with concern for noncompliance with medication  Initial EKG without acute changes      DVT Prophylaxis: heparin  Diet: ADULT DIET;  Regular; 4 carb choices (60 gm/meal)  Code Status: Full Code    PT/OT Eval Status: ordered    Dispo - may need SNF after MARIANELA resolves    Oz Lomeli MD

## 2021-08-22 NOTE — PLAN OF CARE
Problem: Falls - Risk of:  Goal: Will remain free from falls  Description: Will remain free from falls  8/22/2021 1144 by Rayne Kitchen RN  Outcome: Ongoing     Problem: Falls - Risk of:  Goal: Absence of physical injury  Description: Absence of physical injury  8/22/2021 1144 by Rayne Kitchen RN  Outcome: Ongoing     Problem: Skin Integrity:  Goal: Will show no infection signs and symptoms  Description: Will show no infection signs and symptoms  8/22/2021 1144 by Rayne Kitchen RN  Outcome: Ongoing     Problem: Skin Integrity:  Goal: Absence of new skin breakdown  Description: Absence of new skin breakdown  8/22/2021 1144 by Rayne Kitchen RN  Outcome: Ongoing     Problem: Pain:  Goal: Pain level will decrease  Description: Pain level will decrease  8/22/2021 1144 by Rayne Kitchen RN  Outcome: Ongoing     Problem: Pain:  Goal: Control of acute pain  Description: Control of acute pain  8/22/2021 1144 by Rayne Kitchen RN  Outcome: Ongoing     Problem: Pain:  Goal: Control of chronic pain  Description: Control of chronic pain  8/22/2021 1144 by Rayne Kitchen RN  Outcome: Ongoing

## 2021-08-22 NOTE — PROGRESS NOTES
Patient Name: Mechele Schwab                                                    Primary Physician: Lydia Vivar MD  Admitting Dx: General weakness [R53.1]  Acute on chronic renal insufficiency [N28.9, N18.9]    Nephrology [facility]  [email or web address]                                        Plan for today and interim history:    Renal panel from this morning still pending  The patient urine output for yesterday was not documented  The patient most recent set of vital signs showed blood pressure 127/58. She does not require supplemental oxygen  I have reviewed her medication list and I do not see any concerning medications from renal aspect  So from renal aspect, as of now, there is no new intervention needed and I will follow the patient lab work    The patient was seen and examined in her room  She was awake, appears to be very alert, she was also conversant  She to me that she does not feel well, she complains of whole body aching  But more so in her low back  She told me she has no appetite  She does not require supplemental oxygen  She does not have any peripheral edema  Her most recent set of vital signs showed temperature 98.3, heart rate 65 and blood pressure 127/58          Assessment      MARIANELA w/ CKD 3a  · Baseline SCr appears to be 1.3 to 1.5 and comes in with volume depletion / pre-renal not taking her meds due to finances though has Lisinopril 5 mg and Lasix 20 mg as well as Mobic ordered. Holding these for now. · Fluids started NS at 75 cc/hr as she appears dry and not clear she has good PO intake. Certainly appears malnourished. · She can't recall what meds she was taking at home. · She reports missing a kidney and CT in 2013 shows absent Rt Kidney and left kidney appears normal with slightly lobulated contour. Rt Renal Agensis. · UA -ve for blood or protein. Micro not done. · No need for aggressive serologic workup at this time.     · Ml in place with good  cc yesterday. AGMA  · Corrected already with renal improvement. No need for replacement. Hypokalemia  · Replaced and stable now. Holding Lasix. Admitted with K of 3.7. HTN  · BP now 130-140s and holding meds Lisinopril and Norvasc. Please call our office at [phone] or [employer] with any questions or contact me directly. Subjective:     CC / Reason for Consult:  MARIANELA    HPI / PMHx:  This is a consult for Rianna Goodrich  requested by Lydia Vivar MD for the reason of  MARIANELA . Rianna Goodrich is a 79 y.o. female admitted for Weakness and MARIANELA w/ PMHx of  CKD 3a, HTN, HLD, Hypothyroid. Baseline SCr of 1.3 to 1.5 in 2014 and eGFR = 53. Comes in now at 3.0 with bicarb of 18. SCr peaked at 3.3 then improved to 2.6 on day of consult. She was at home without electricity or meds as she had no money and family called squad as she was too weak to get up. Seen by Dr. Rick Osman in 2017 for PCP. She can't recall what meds she was taking or what happened at home prior to admission. I thank Dr. Lydia Vivar MD for consulting [employer]. [facility] in the care of your patient. Please call with any questions or concerns. [phone]. Lucy Petit    Objective:     SocHx: Lives alone. Smoked 1/2 ppd. Denies alcohol use. FamHx: Heart Dz and HTN.      Current Medications:  Scheduled Medications:  lidocaine, 1 patch, Daily  atorvastatin, 40 mg, Nightly  famotidine, 10 mg, Daily  ferrous sulfate, 324 mg, TID WC  melatonin, 3 mg, Nightly  sodium chloride flush, 5-40 mL, 2 times per day  levothyroxine, 250 mcg, Daily  heparin (porcine), 5,000 Units, BID  nicotine, 1 patch, Daily       IV Meds:  sodium chloride, Last Rate: 25 mL (08/19/21 1150)       PRN Medications:  LORazepam, 0.5 mg, Q6H PRN  morphine, 2 mg, Q2H PRN   Or  morphine, 4 mg, Q2H PRN  hydrOXYzine, 25 mg, BID PRN  sodium chloride flush, 5-40 mL, PRN  sodium chloride, 25 mL, PRN  ondansetron, 4 mg, Q8H PRN Or  ondansetron, 4 mg, Q6H PRN  acetaminophen, 650 mg, Q6H PRN   Or  acetaminophen, 650 mg, Q6H PRN        Allergies: Sulfa antibiotics    ROS: Positives in BOLD     GEN:   fevers, chills, sweats, fatigue and weight loss     HEENT:    nasal congestion, sore mouth and sore throat     Resp:    cough, hemoptysis, pneumonia or dyspnea on exertion     Card:  chest pain, chest pressure/discomfort, dyspnea, palpitations,  lower extremity edema     GI:   nausea, vomiting, diarrhea, constipation and abdominal pain     :  dysuria, nocturia, urinary incontinence, hesitancy and hematuria     Derm:   rash, skin lesion(s), pruritus and dryness     Neuro:   headaches, dizziness, seizures, gait problems, tremor and weakness     MS:  myalgias, arthralgias, neck pain and back pain     Endo:    nephropathy and cardiovascular disease    PE:      Gen: alert, well appearing, and in no distress     HEENT:pupils equal and reactive, extraocular eye movements intact      Neuro: alert, oriented, normal speech, no focal findings or movement disorder noted     Neck:  supple, no significant adenopathy     Cardio: normal rate, regular rhythm, normal S1, S2, no murmurs, rubs, clicks or gallops      Resp: clear to auscultation, no wheezes, rales or rhonchi, symmetric air entry. GI:  soft, nontender, nondistended, no masses or organomegaly. Ext:  peripheral pulses normal, no pedal edema, no clubbing or cyanosis      MS: no joint tenderness, deformity or swelling      DERM: normal coloration and turgor, no rashesor bruising.        Vitals:   Patient Vitals for the past 8 hrs:   BP Temp Temp src Pulse Resp SpO2 Weight   08/22/21 0400 (!) 127/58 98.3 °F (36.8 °C) Oral 65 18 93 % 113 lb 8.6 oz (51.5 kg)          I/Os:     Intake/Output Summary (Last 24 hours) at 8/22/2021 2670  Last data filed at 8/21/2021 1804  Gross per 24 hour   Intake 2542.86 ml   Output --   Net 2542.86 ml       LABS:    Lab Results   Component Value Date    CREATININE 2.2 08/21/2021    BUN 25 08/21/2021     08/21/2021    K 3.8 08/21/2021    K 3.7 08/16/2021     08/21/2021    CO2 24 08/21/2021     No results found for: [id number]   Lab Results   Component Value Date    WBC 6.1 08/16/2021    HGB 11.2 08/16/2021    HCT 33.3 08/16/2021    MCV 88.4 08/16/2021     08/16/2021      Lab Results   Component Value Date    IRON 17 06/25/2014    TIBC 376 03/10/2014    FERRITIN 5 06/25/2014      No results found for: Katy Pal  Lab Results   Component Value Date    CALCIUM 8.3 08/21/2021    PHOS 2.4 08/21/2021

## 2021-08-23 LAB
ALBUMIN SERPL-MCNC: 3.4 G/DL (ref 3.4–5)
ANION GAP SERPL CALCULATED.3IONS-SCNC: 12 MMOL/L (ref 3–16)
BUN BLDV-MCNC: 24 MG/DL (ref 7–20)
CALCIUM SERPL-MCNC: 8.8 MG/DL (ref 8.3–10.6)
CHLORIDE BLD-SCNC: 106 MMOL/L (ref 99–110)
CO2: 22 MMOL/L (ref 21–32)
CREAT SERPL-MCNC: 2.4 MG/DL (ref 0.6–1.2)
CREATININE URINE: 67.2 MG/DL (ref 28–259)
GFR AFRICAN AMERICAN: 24
GFR NON-AFRICAN AMERICAN: 20
GLUCOSE BLD-MCNC: 82 MG/DL (ref 70–99)
PHOSPHORUS: 2.3 MG/DL (ref 2.5–4.9)
POTASSIUM SERPL-SCNC: 4.4 MMOL/L (ref 3.5–5.1)
PROTEIN PROTEIN: 11 MG/DL
PROTEIN/CREAT RATIO: 0.2 MG/DL
SODIUM BLD-SCNC: 140 MMOL/L (ref 136–145)

## 2021-08-23 PROCEDURE — 6370000000 HC RX 637 (ALT 250 FOR IP): Performed by: NURSE PRACTITIONER

## 2021-08-23 PROCEDURE — 6370000000 HC RX 637 (ALT 250 FOR IP): Performed by: INTERNAL MEDICINE

## 2021-08-23 PROCEDURE — 97116 GAIT TRAINING THERAPY: CPT

## 2021-08-23 PROCEDURE — 84156 ASSAY OF PROTEIN URINE: CPT

## 2021-08-23 PROCEDURE — 6360000002 HC RX W HCPCS: Performed by: INTERNAL MEDICINE

## 2021-08-23 PROCEDURE — 6370000000 HC RX 637 (ALT 250 FOR IP): Performed by: STUDENT IN AN ORGANIZED HEALTH CARE EDUCATION/TRAINING PROGRAM

## 2021-08-23 PROCEDURE — 94760 N-INVAS EAR/PLS OXIMETRY 1: CPT

## 2021-08-23 PROCEDURE — 6360000002 HC RX W HCPCS: Performed by: NURSE PRACTITIONER

## 2021-08-23 PROCEDURE — 2580000003 HC RX 258: Performed by: NURSE PRACTITIONER

## 2021-08-23 PROCEDURE — 97110 THERAPEUTIC EXERCISES: CPT

## 2021-08-23 PROCEDURE — 97530 THERAPEUTIC ACTIVITIES: CPT

## 2021-08-23 PROCEDURE — 82570 ASSAY OF URINE CREATININE: CPT

## 2021-08-23 PROCEDURE — 36415 COLL VENOUS BLD VENIPUNCTURE: CPT

## 2021-08-23 PROCEDURE — 1200000000 HC SEMI PRIVATE

## 2021-08-23 PROCEDURE — 6370000000 HC RX 637 (ALT 250 FOR IP): Performed by: PHYSICIAN ASSISTANT

## 2021-08-23 PROCEDURE — 80069 RENAL FUNCTION PANEL: CPT

## 2021-08-23 RX ORDER — AMLODIPINE BESYLATE 10 MG/1
10 TABLET ORAL DAILY
Status: DISCONTINUED | OUTPATIENT
Start: 2021-08-23 | End: 2021-08-25 | Stop reason: HOSPADM

## 2021-08-23 RX ADMIN — SODIUM CHLORIDE, PRESERVATIVE FREE 10 ML: 5 INJECTION INTRAVENOUS at 20:54

## 2021-08-23 RX ADMIN — HEPARIN SODIUM 5000 UNITS: 5000 INJECTION INTRAVENOUS; SUBCUTANEOUS at 09:14

## 2021-08-23 RX ADMIN — SODIUM CHLORIDE, PRESERVATIVE FREE 10 ML: 5 INJECTION INTRAVENOUS at 09:17

## 2021-08-23 RX ADMIN — HYDROXYZINE PAMOATE 25 MG: 25 CAPSULE ORAL at 18:47

## 2021-08-23 RX ADMIN — LEVOTHYROXINE SODIUM 250 MCG: 0.12 TABLET ORAL at 06:49

## 2021-08-23 RX ADMIN — Medication 3 MG: at 20:52

## 2021-08-23 RX ADMIN — HEPARIN SODIUM 5000 UNITS: 5000 INJECTION INTRAVENOUS; SUBCUTANEOUS at 20:53

## 2021-08-23 RX ADMIN — FERROUS SULFATE TAB EC 324 MG (65 MG FE EQUIVALENT) 324 MG: 324 (65 FE) TABLET DELAYED RESPONSE at 15:54

## 2021-08-23 RX ADMIN — AMLODIPINE BESYLATE 10 MG: 10 TABLET ORAL at 16:51

## 2021-08-23 RX ADMIN — ATORVASTATIN CALCIUM 40 MG: 40 TABLET, FILM COATED ORAL at 20:52

## 2021-08-23 RX ADMIN — MORPHINE SULFATE 4 MG: 4 INJECTION, SOLUTION INTRAMUSCULAR; INTRAVENOUS at 22:55

## 2021-08-23 RX ADMIN — ACETAMINOPHEN 650 MG: 325 TABLET ORAL at 20:52

## 2021-08-23 RX ADMIN — LORAZEPAM 0.5 MG: 0.5 TABLET ORAL at 22:55

## 2021-08-23 RX ADMIN — MORPHINE SULFATE 4 MG: 4 INJECTION, SOLUTION INTRAMUSCULAR; INTRAVENOUS at 16:50

## 2021-08-23 RX ADMIN — MORPHINE SULFATE 2 MG: 2 INJECTION, SOLUTION INTRAMUSCULAR; INTRAVENOUS at 06:49

## 2021-08-23 RX ADMIN — FERROUS SULFATE TAB EC 324 MG (65 MG FE EQUIVALENT) 324 MG: 324 (65 FE) TABLET DELAYED RESPONSE at 12:17

## 2021-08-23 RX ADMIN — FAMOTIDINE 10 MG: 20 TABLET ORAL at 09:16

## 2021-08-23 RX ADMIN — ACETAMINOPHEN 650 MG: 325 TABLET ORAL at 15:53

## 2021-08-23 RX ADMIN — MORPHINE SULFATE 4 MG: 4 INJECTION, SOLUTION INTRAMUSCULAR; INTRAVENOUS at 10:38

## 2021-08-23 RX ADMIN — MORPHINE SULFATE 4 MG: 4 INJECTION, SOLUTION INTRAMUSCULAR; INTRAVENOUS at 13:42

## 2021-08-23 RX ADMIN — LORAZEPAM 0.5 MG: 0.5 TABLET ORAL at 15:57

## 2021-08-23 RX ADMIN — FERROUS SULFATE TAB EC 324 MG (65 MG FE EQUIVALENT) 324 MG: 324 (65 FE) TABLET DELAYED RESPONSE at 09:16

## 2021-08-23 RX ADMIN — ACETAMINOPHEN 650 MG: 325 TABLET ORAL at 08:57

## 2021-08-23 RX ADMIN — LORAZEPAM 0.5 MG: 0.5 TABLET ORAL at 09:14

## 2021-08-23 RX ADMIN — ACETAMINOPHEN 650 MG: 325 TABLET ORAL at 02:38

## 2021-08-23 ASSESSMENT — PAIN SCALES - GENERAL
PAINLEVEL_OUTOF10: 9
PAINLEVEL_OUTOF10: 9
PAINLEVEL_OUTOF10: 0
PAINLEVEL_OUTOF10: 4
PAINLEVEL_OUTOF10: 0
PAINLEVEL_OUTOF10: 0
PAINLEVEL_OUTOF10: 9
PAINLEVEL_OUTOF10: 7
PAINLEVEL_OUTOF10: 7
PAINLEVEL_OUTOF10: 0
PAINLEVEL_OUTOF10: 8
PAINLEVEL_OUTOF10: 6
PAINLEVEL_OUTOF10: 0
PAINLEVEL_OUTOF10: 0
PAINLEVEL_OUTOF10: 3
PAINLEVEL_OUTOF10: 0

## 2021-08-23 ASSESSMENT — PAIN DESCRIPTION - ONSET: ONSET: ON-GOING

## 2021-08-23 ASSESSMENT — PAIN - FUNCTIONAL ASSESSMENT: PAIN_FUNCTIONAL_ASSESSMENT: ACTIVITIES ARE NOT PREVENTED

## 2021-08-23 ASSESSMENT — PAIN DESCRIPTION - PAIN TYPE: TYPE: ACUTE PAIN

## 2021-08-23 ASSESSMENT — PAIN DESCRIPTION - PROGRESSION: CLINICAL_PROGRESSION: NOT CHANGED

## 2021-08-23 ASSESSMENT — PAIN DESCRIPTION - DESCRIPTORS: DESCRIPTORS: ACHING;SORE

## 2021-08-23 ASSESSMENT — PAIN DESCRIPTION - LOCATION: LOCATION: BACK;GENERALIZED

## 2021-08-23 ASSESSMENT — PAIN DESCRIPTION - FREQUENCY: FREQUENCY: INTERMITTENT

## 2021-08-23 NOTE — PLAN OF CARE
Problem: Falls - Risk of:  Goal: Will remain free from falls  Description: Will remain free from falls  8/23/2021 1122 by Lizzette Agrawal RN  Outcome: Ongoing     Problem: Falls - Risk of:  Goal: Absence of physical injury  Description: Absence of physical injury  8/23/2021 1122 by Lizzette Agrawal RN  Outcome: Ongoing     Problem: Skin Integrity:  Goal: Will show no infection signs and symptoms  Description: Will show no infection signs and symptoms  8/23/2021 1122 by Lizzette Agrawal RN  Outcome: Ongoing     Problem: Skin Integrity:  Goal: Absence of new skin breakdown  Description: Absence of new skin breakdown  8/23/2021 1122 by Lizzette Agrawal RN  Outcome: Ongoing     Problem: Pain:  Goal: Pain level will decrease  Description: Pain level will decrease  8/23/2021 1122 by Lizzette Agrawal RN  Outcome: Ongoing     Problem: Pain:  Goal: Control of acute pain  Description: Control of acute pain  8/23/2021 1122 by Lizzette Agrawal RN  Outcome: Ongoing     Problem: Pain:  Goal: Control of chronic pain  Description: Control of chronic pain  8/23/2021 1122 by Lizzette Agrawal RN  Outcome: Ongoing

## 2021-08-23 NOTE — PROGRESS NOTES
Occupational Therapy  Facility/Department: [address] MED SURG  Daily Treatment Note  NAME: Kalie Waters  : 1950  MRN: [id number]    Date of Service: 2021    Discharge Recommendations:  24 hour supervision or assist, Home with Home health OT, S Level 1, 3-5 sessions per week, Patient would benefit from continued therapy after discharge       Assessment   Performance deficits / Impairments: Decreased functional mobility ; Decreased strength;Decreased endurance;Decreased ADL status; Decreased cognition  Assessment: Pt limited this date due to c/o pain in both knees and her back. Pt requested pain medication although she received 45 minutes prior. Pt declining further activity due to c/o pain. Pt completed mobility and transfers this date with CGA. Pt requesting to return to bed at end of the session. Recommend 3-5x week at a facility vs 24 our assist from family. Prognosis: Fair  Activity Tolerance  Activity Tolerance: Treatment limited secondary to decreased cognition;Patient limited by fatigue;Patient limited by pain  Safety Devices  Type of devices: Call light within reach; Left in bed;Gait belt;Bed alarm in place         Patient Diagnosis(es): The primary encounter diagnosis was General weakness. A diagnosis of MARIANELA (acute kidney injury) ([facility] [address] was also pertinent to this visit. has a past medical history of Concussion, HTN (hypertension), Hyperlipidemia, and Hypothyroid. has a past surgical history that includes Arm Surgery () and Appendectomy ().     Restrictions  Restrictions/Precautions  Restrictions/Precautions: Fall Risk  Position Activity Restriction  Other position/activity restrictions: crystal  Subjective   General  Chart Reviewed: Yes, Progress Notes  Patient assessed for rehabilitation services?: Yes  Additional Pertinent Hx: Dementia  Family / Caregiver Present: No  Referring Practitioner: Shavon Mustafa  Diagnosis: Weakness; Confusion  Subjective  Subjective: Pt seen bedside and agreed to OT treatment. Pt with c/o pain in back and knees but unable to rate.       Objective    ADL  LE Dressing: Maximum assistance (Required assist to manage both socks.)        Balance  Sitting Balance: Supervision  Standing Balance: Contact guard assistance  Functional Mobility  Functional - Mobility Device: Rolling Walker  Activity: Other (ambulated to doorway and returned to the bed.)  Assist Level: Contact guard assistance  Toilet Transfers  Toilet Transfers Comments: Declined need to use the toilet  Wheelchair Bed Transfers  Wheelchair/Bed - Technique: Ambulating  Equipment Used: Bed  Level of Asssistance: Contact guard assistance  Bed mobility  Supine to Sit: Stand by assistance  Sit to Supine: Stand by assistance (HOB elevated, used bed rails)  Scooting: Supervision  Transfers  Sit to stand: Contact guard assistance  Stand to sit: Contact guard assistance  Transfer Comments: vc cues for safety of hand placement                                                                 Plan   Plan  Times per week: 3-5  Times per day: Daily  Current Treatment Recommendations: Strengthening, Functional Mobility Training, Cognitive Reorientation, Balance Training, Safety Education & Training, Self-Care / ADL, Endurance Training, Equipment Evaluation, Education, & procurement, Patient/Caregiver Education & Training    AM-PAC Score        -[facility] Inpatient Daily Activity Raw Score: 16 (08/23/21 1443)  AM-PAC Inpatient ADL T-Scale Score : 35.96 (08/23/21 1443)  ADL Inpatient CMS 0-100% Score: 53.32 (08/23/21 1443)  ADL Inpatient CMS G-Code Modifier : CK (08/23/21 1443)    Goals  Short term goals  Time Frame for Short term goals: by d/c  Short term goal 1: Pt will complete LB dressing with mod I  Short term goal 2: Pt will complete toileting with mod I  Short term goal 3: Pt will complete bathing with supervision  Short term goal 4: Pt will complete bathroom mobility with LRAD as needed with supervision Therapy Time   Individual Concurrent Group Co-treatment   Time In 7985         Time Out 1440         Minutes 25              This note to serve as OT d/c summary if pt is d/c-ed from hospital prior to next OT session.       Barbra Mack, [address]

## 2021-08-23 NOTE — PROGRESS NOTES
Comprehensive Nutrition Assessment    Type and Reason for Visit:  Initial, RD Nutrition Re-Screen/LOS    Nutrition Recommendations/Plan:   Add Glucerna bid to start    Nutrition Assessment:  Pt screened for LOS. PMH includes; HTN, HLD, COPD, Dementia (per family). Diet adv to [facility] (4). Pt reported tolerating diet, but records reveal variable intake at %. Will add Glucerna bid to start to help ensure adequacy. Malnutrition Assessment:  Malnutrition Status: At risk for malnutrition (Comment)    Context:  Acute Illness     Findings of the 6 clinical characteristics of malnutrition:  Energy Intake:  Mild decrease in energy intake (Comment)  Weight Loss:  No significant weight loss     Body Fat Loss:  No significant body fat loss     Muscle Mass Loss:  No significant muscle mass loss    Fluid Accumulation:  No significant fluid accumulation     Strength:  Not Performed    Estimated Daily Nutrient Needs:  Energy (kcal):  3653-5905 (30-35 x ABW 48 kg); Weight Used for Energy Requirements:        Protein (g):  58-72 (1.2-1.5 x ABW 48 kg); Weight Used for Protein Requirements:           Fluid (ml/day):   ; Method Used for Fluid Requirements:  1 ml/kcal      Nutrition Related Findings:  Noted no edema. Noted BS with acceptable readings      Wounds:  None       Current Nutrition Therapies:    ADULT DIET;  Regular; 4 carb choices (60 gm/meal)    Anthropometric Measures:  · Height: 5' 4\" (162.6 cm)  · Current Body Weight: 105 lb (47.6 kg)   · Usual Body Weight:  (100-105 lbs per pt)     · Ideal Body Weight: 120 lbs; % Ideal Body Weight 87.5 %   · BMI: 18  · Adjusted Body Weight:  ; No Adjustment   · BMI Categories: Underweight (BMI less than 22) age over 72       Nutrition Diagnosis:   · Inadequate oral intake related to inadequate protein-energy intake as evidenced by intake 26-50%, intake 51-75%      Nutrition Interventions:   Food and/or Nutrient Delivery:  Continue Current Diet, Start Oral Nutrition Supplement  Nutrition Education/Counseling:  No recommendation at this time   Coordination of Nutrition Care:  Continue to monitor while inpatient    Goals:  consume >/= to 50 %       Nutrition Monitoring and Evaluation:   Behavioral-Environmental Outcomes:  None Identified   Food/Nutrient Intake Outcomes:  Food and Nutrient Intake, Supplement Intake  Physical Signs/Symptoms Outcomes:  Biochemical Data, Constipation, Diarrhea, Weight, Skin, Fluid Status or Edema     Discharge Planning:     Too soon to determine     Electronically signed by Jena Lala RD, LD on 8/23/21 at 4:22 PM EDT    Contact: [phone]

## 2021-08-23 NOTE — CARE COORDINATION
Updated niece, Leah Mancera, of SNFs unable to take Claudette Sauce. Referrals sent to [facility], [address] [facility], [address] [place] [address].     Electronically signed by Elaine García RN on 8/23/21 at 3:55 PM EDT
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
## 2021-08-23 NOTE — PLAN OF CARE
Problem: Falls - Risk of:  Goal: Will remain free from falls  Description: Will remain free from falls  8/22/2021 2241 by Duglas Benoit RN  Outcome: Ongoing  8/22/2021 1144 by Roxi Dietz RN  Outcome: Ongoing  Goal: Absence of physical injury  Description: Absence of physical injury  8/22/2021 2241 by Duglas Benoit RN  Outcome: Ongoing  8/22/2021 1144 by Roxi Dietz RN  Outcome: Ongoing     Problem: Skin Integrity:  Goal: Will show no infection signs and symptoms  Description: Will show no infection signs and symptoms  8/22/2021 2241 by Duglas Benoit RN  Outcome: Ongoing  8/22/2021 1144 by Roxi Dietz RN  Outcome: Ongoing  Goal: Absence of new skin breakdown  Description: Absence of new skin breakdown  8/22/2021 2241 by Duglas Benoit RN  Outcome: Ongoing       Problem: Pain:  Goal: Pain level will decrease  Description: Pain level will decrease  8/22/2021 2241 by Duglas Benoit RN  Outcome: Ongoing    Goal: Control of acute pain  Description: Control of acute pain  8/22/2021 2241 by Duglas Benoit RN  Outcome: Ongoing    Goal: Control of chronic pain  Description: Control of chronic pain  8/22/2021 2241 by Duglas Benoit RN  Outcome: Ongoing
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
## 2021-08-24 LAB
ALBUMIN SERPL-MCNC: 3.7 G/DL (ref 3.4–5)
ANION GAP SERPL CALCULATED.3IONS-SCNC: 12 MMOL/L (ref 3–16)
BUN BLDV-MCNC: 21 MG/DL (ref 7–20)
CALCIUM SERPL-MCNC: 9.1 MG/DL (ref 8.3–10.6)
CHLORIDE BLD-SCNC: 103 MMOL/L (ref 99–110)
CO2: 22 MMOL/L (ref 21–32)
CREAT SERPL-MCNC: 2.3 MG/DL (ref 0.6–1.2)
GFR AFRICAN AMERICAN: 25
GFR NON-AFRICAN AMERICAN: 21
GLUCOSE BLD-MCNC: 96 MG/DL (ref 70–99)
PHOSPHORUS: 2.5 MG/DL (ref 2.5–4.9)
POTASSIUM SERPL-SCNC: 4.3 MMOL/L (ref 3.5–5.1)
SARS-COV-2: NOT DETECTED
SODIUM BLD-SCNC: 137 MMOL/L (ref 136–145)

## 2021-08-24 PROCEDURE — 2580000003 HC RX 258: Performed by: NURSE PRACTITIONER

## 2021-08-24 PROCEDURE — U0003 INFECTIOUS AGENT DETECTION BY NUCLEIC ACID (DNA OR RNA); SEVERE ACUTE RESPIRATORY SYNDROME CORONAVIRUS 2 (SARS-COV-2) (CORONAVIRUS DISEASE [COVID-19]), AMPLIFIED PROBE TECHNIQUE, MAKING USE OF HIGH THROUGHPUT TECHNOLOGIES AS DESCRIBED BY CMS-2020-01-R: HCPCS

## 2021-08-24 PROCEDURE — 36415 COLL VENOUS BLD VENIPUNCTURE: CPT

## 2021-08-24 PROCEDURE — 6360000002 HC RX W HCPCS: Performed by: NURSE PRACTITIONER

## 2021-08-24 PROCEDURE — 80069 RENAL FUNCTION PANEL: CPT

## 2021-08-24 PROCEDURE — 6360000002 HC RX W HCPCS: Performed by: INTERNAL MEDICINE

## 2021-08-24 PROCEDURE — 6370000000 HC RX 637 (ALT 250 FOR IP): Performed by: INTERNAL MEDICINE

## 2021-08-24 PROCEDURE — 6370000000 HC RX 637 (ALT 250 FOR IP): Performed by: PHYSICIAN ASSISTANT

## 2021-08-24 PROCEDURE — 6370000000 HC RX 637 (ALT 250 FOR IP): Performed by: NURSE PRACTITIONER

## 2021-08-24 PROCEDURE — 6370000000 HC RX 637 (ALT 250 FOR IP): Performed by: STUDENT IN AN ORGANIZED HEALTH CARE EDUCATION/TRAINING PROGRAM

## 2021-08-24 PROCEDURE — U0005 INFEC AGEN DETEC AMPLI PROBE: HCPCS

## 2021-08-24 PROCEDURE — 9990000010 HC NO CHARGE VISIT

## 2021-08-24 PROCEDURE — 1200000000 HC SEMI PRIVATE

## 2021-08-24 RX ORDER — FAMOTIDINE 10 MG
10 TABLET ORAL DAILY
Qty: 60 TABLET | Refills: 3 | Status: SHIPPED | OUTPATIENT
Start: 2021-08-25

## 2021-08-24 RX ORDER — LORAZEPAM 0.5 MG/1
0.5 TABLET ORAL EVERY 8 HOURS PRN
Qty: 9 TABLET | Refills: 0 | Status: SHIPPED | OUTPATIENT
Start: 2021-08-24 | End: 2021-08-27

## 2021-08-24 RX ORDER — AMLODIPINE BESYLATE 10 MG/1
10 TABLET ORAL DAILY
Qty: 30 TABLET | Refills: 3 | Status: SHIPPED | OUTPATIENT
Start: 2021-08-25

## 2021-08-24 RX ADMIN — HEPARIN SODIUM 5000 UNITS: 5000 INJECTION INTRAVENOUS; SUBCUTANEOUS at 08:34

## 2021-08-24 RX ADMIN — FERROUS SULFATE TAB EC 324 MG (65 MG FE EQUIVALENT) 324 MG: 324 (65 FE) TABLET DELAYED RESPONSE at 17:52

## 2021-08-24 RX ADMIN — SODIUM CHLORIDE, PRESERVATIVE FREE 10 ML: 5 INJECTION INTRAVENOUS at 08:45

## 2021-08-24 RX ADMIN — ACETAMINOPHEN 650 MG: 325 TABLET ORAL at 05:28

## 2021-08-24 RX ADMIN — SODIUM CHLORIDE, PRESERVATIVE FREE 10 ML: 5 INJECTION INTRAVENOUS at 21:06

## 2021-08-24 RX ADMIN — ACETAMINOPHEN 650 MG: 325 TABLET ORAL at 17:52

## 2021-08-24 RX ADMIN — MORPHINE SULFATE 4 MG: 4 INJECTION, SOLUTION INTRAMUSCULAR; INTRAVENOUS at 08:32

## 2021-08-24 RX ADMIN — ATORVASTATIN CALCIUM 40 MG: 40 TABLET, FILM COATED ORAL at 21:05

## 2021-08-24 RX ADMIN — MORPHINE SULFATE 4 MG: 4 INJECTION, SOLUTION INTRAMUSCULAR; INTRAVENOUS at 13:53

## 2021-08-24 RX ADMIN — FERROUS SULFATE TAB EC 324 MG (65 MG FE EQUIVALENT) 324 MG: 324 (65 FE) TABLET DELAYED RESPONSE at 11:13

## 2021-08-24 RX ADMIN — ACETAMINOPHEN 650 MG: 325 TABLET ORAL at 11:39

## 2021-08-24 RX ADMIN — AMLODIPINE BESYLATE 10 MG: 10 TABLET ORAL at 08:32

## 2021-08-24 RX ADMIN — FAMOTIDINE 10 MG: 20 TABLET ORAL at 08:32

## 2021-08-24 RX ADMIN — LORAZEPAM 0.5 MG: 0.5 TABLET ORAL at 21:05

## 2021-08-24 RX ADMIN — FERROUS SULFATE TAB EC 324 MG (65 MG FE EQUIVALENT) 324 MG: 324 (65 FE) TABLET DELAYED RESPONSE at 08:32

## 2021-08-24 RX ADMIN — Medication 3 MG: at 21:05

## 2021-08-24 RX ADMIN — HYDROXYZINE PAMOATE 25 MG: 25 CAPSULE ORAL at 08:32

## 2021-08-24 RX ADMIN — MORPHINE SULFATE 4 MG: 4 INJECTION, SOLUTION INTRAMUSCULAR; INTRAVENOUS at 21:05

## 2021-08-24 RX ADMIN — LORAZEPAM 0.5 MG: 0.5 TABLET ORAL at 11:13

## 2021-08-24 RX ADMIN — LEVOTHYROXINE SODIUM 250 MCG: 0.12 TABLET ORAL at 05:28

## 2021-08-24 ASSESSMENT — PAIN SCALES - GENERAL
PAINLEVEL_OUTOF10: 3
PAINLEVEL_OUTOF10: 0
PAINLEVEL_OUTOF10: 3
PAINLEVEL_OUTOF10: 3
PAINLEVEL_OUTOF10: 8
PAINLEVEL_OUTOF10: 9
PAINLEVEL_OUTOF10: 9
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
## 2021-08-24 NOTE — CARE COORDINATION
08/24/21 1545   IMM Letter   IMM Letter given to Patient/Family/Significant other/Guardian/POA/by: Milagros Leyva RN   IMM Letter date given: 08/24/21   IMM Letter time given: 1130     Electronically signed by Milagros Leyva RN on 8/24/21 at 3:45 PM EDT

## 2021-08-24 NOTE — CARE COORDINATION
Referral sent yesterday to:    [facility]. Electronically signed by Reji Beltran RN on 8/24/21 at 11:19 AM EDT    [facility] just returned call & can accept. Maceyece notified. Doctor updated. Electronically signed by Reji Beltran RN on 8/24/21 at 11:20 AM EDT    HENS completed.     Electronically signed by Reji Beltran RN on 8/24/21 at 11:29 AM EDT

## 2021-08-24 NOTE — PROGRESS NOTES
Pt awake with complaint of pain in lower back. Pt states pain is 9/10.  PRN Morphine administered per Mar.

## 2021-08-24 NOTE — CARE COORDINATION
CASE MANAGEMENT DISCHARGE SUMMARY:    DISCHARGE DATE: 8/24/21    DISCHARGED TO:   Discharging to Facility/ Agency   · Name: [facility]   · Address:  [address]   · Phone:  [phone]  · Fax:  [phone]    TRANSPORTATION: [facility]             TIME: 1700    HENS/PASAAR COMPLETED: 8/24/21    Pt & niece updated on dc plan &  time.     Trixie Becker RN, BSN, Case Management  [phone]    Electronically signed by Trixie Becker RN on 8/24/2021 at 11:56 AM

## 2021-08-24 NOTE — PROGRESS NOTES
3. 7.     HTN  · BP now now and holding meds Lisinopril and Norvasc. Hypothyroidism:  · Severe hypothyroidism, TSH 96.78 and FT4 0.2  · Now on synthroid      ROS:     Positives Listed Bold. All other remaining systems are negative. Constitutional:  fever, chills, weakness, weight change, fatigue,      Skin:  rash, pruritus, hair loss, bruising, dry skin, petechiae. Head, Face, Neck   headaches, swelling,  cervical adenopathy. Respiratory: shortness of breath, cough, or wheezing  Cardiovascular: chest pain, palpitations, dizzy, edema  Gastrointestinal: nausea, vomiting, diarrhea, constipation,belly pain    Yellow skin, blood in stool  Musculoskeletal:  back pain, muscle weakness, gait problems,       joint pain or swelling. Genitourinary:  dysuria, poor urine flow, flank pain, blood in urine  Neurologic:  vertigo, TIA'S, syncope, seizures, focal weakness  Psychosocial:  insomnia, anxiety, or depression. Additional positive findings: -     PMH:   Past medical history, surgical history, social history, family history are reviewed and updated as appropriate. Reviewed current medication list.   Allergies reviewed and updated as needed. PE:   Vitals:    08/24/21 0444   BP: 128/72   Pulse: 66   Resp: 14   Temp: 98.2 °F (36.8 °C)   SpO2: 96%       General appearance: AOx3 in NAD, fully alert and oriented. Comfortable. HEENT: EOM intact, no icterus. Trachea is midline. Neck : No masses, appears symmetrical, no JVD  Respiratory: Respiratory effort appears normal, bilateral equal chest rise, no wheeze, no crackles  Cardiovascular: Ausculation shows RRR no edema  Abdomen: No visible mass or tenderness, non distended. Musculoskeletal:  Joints with no swelling or deformity. Skin:no rashes, ulcers, induration, no jaundice. Neuro: face symmetric, no focal deficits. Appropriate responses.        Lab Results   Component Value Date    CREATININE 2.3 (H) 08/24/2021    BUN 21 (H) 08/24/2021     08/24/2021    K 4.3 08/24/2021     08/24/2021    CO2 22 08/24/2021      Lab Results   Component Value Date    WBC 6.1 08/16/2021    HGB 11.2 (L) 08/16/2021    HCT 33.3 (L) 08/16/2021    MCV 88.4 08/16/2021     08/16/2021     Lab Results   Component Value Date    CALCIUM 9.1 08/24/2021    PHOS 2.5 08/24/2021

## 2021-08-25 VITALS
HEART RATE: 59 BPM | WEIGHT: 107.58 LBS | HEIGHT: 64 IN | RESPIRATION RATE: 14 BRPM | SYSTOLIC BLOOD PRESSURE: 164 MMHG | OXYGEN SATURATION: 96 % | TEMPERATURE: 97.8 F | BODY MASS INDEX: 18.37 KG/M2 | DIASTOLIC BLOOD PRESSURE: 75 MMHG

## 2021-08-25 LAB
ALBUMIN SERPL-MCNC: 3.5 G/DL (ref 3.4–5)
ANION GAP SERPL CALCULATED.3IONS-SCNC: 13 MMOL/L (ref 3–16)
BUN BLDV-MCNC: 21 MG/DL (ref 7–20)
CALCIUM SERPL-MCNC: 9 MG/DL (ref 8.3–10.6)
CHLORIDE BLD-SCNC: 104 MMOL/L (ref 99–110)
CO2: 21 MMOL/L (ref 21–32)
CREAT SERPL-MCNC: 2.2 MG/DL (ref 0.6–1.2)
GFR AFRICAN AMERICAN: 27
GFR NON-AFRICAN AMERICAN: 22
GLUCOSE BLD-MCNC: 75 MG/DL (ref 70–99)
PARATHYROID HORMONE INTACT: 51 PG/ML (ref 14–72)
PHOSPHORUS: 2.6 MG/DL (ref 2.5–4.9)
POTASSIUM SERPL-SCNC: 4.3 MMOL/L (ref 3.5–5.1)
SODIUM BLD-SCNC: 138 MMOL/L (ref 136–145)
VITAMIN D 25-HYDROXY: <5 NG/ML

## 2021-08-25 PROCEDURE — 83970 ASSAY OF PARATHORMONE: CPT

## 2021-08-25 PROCEDURE — 80069 RENAL FUNCTION PANEL: CPT

## 2021-08-25 PROCEDURE — 6370000000 HC RX 637 (ALT 250 FOR IP): Performed by: STUDENT IN AN ORGANIZED HEALTH CARE EDUCATION/TRAINING PROGRAM

## 2021-08-25 PROCEDURE — 6370000000 HC RX 637 (ALT 250 FOR IP): Performed by: NURSE PRACTITIONER

## 2021-08-25 PROCEDURE — 6370000000 HC RX 637 (ALT 250 FOR IP): Performed by: INTERNAL MEDICINE

## 2021-08-25 PROCEDURE — 6370000000 HC RX 637 (ALT 250 FOR IP): Performed by: PHYSICIAN ASSISTANT

## 2021-08-25 PROCEDURE — 82306 VITAMIN D 25 HYDROXY: CPT

## 2021-08-25 PROCEDURE — 94761 N-INVAS EAR/PLS OXIMETRY MLT: CPT

## 2021-08-25 PROCEDURE — 36415 COLL VENOUS BLD VENIPUNCTURE: CPT

## 2021-08-25 RX ORDER — HYDROCODONE BITARTRATE AND ACETAMINOPHEN 5; 325 MG/1; MG/1
1 TABLET ORAL ONCE
Status: COMPLETED | OUTPATIENT
Start: 2021-08-25 | End: 2021-08-25

## 2021-08-25 RX ADMIN — ACETAMINOPHEN 650 MG: 325 TABLET ORAL at 08:46

## 2021-08-25 RX ADMIN — HYDROXYZINE PAMOATE 25 MG: 25 CAPSULE ORAL at 08:47

## 2021-08-25 RX ADMIN — FAMOTIDINE 10 MG: 20 TABLET ORAL at 08:47

## 2021-08-25 RX ADMIN — FERROUS SULFATE TAB EC 324 MG (65 MG FE EQUIVALENT) 324 MG: 324 (65 FE) TABLET DELAYED RESPONSE at 08:47

## 2021-08-25 RX ADMIN — HYDROCODONE BITARTRATE AND ACETAMINOPHEN 1 TABLET: 5; 325 TABLET ORAL at 03:01

## 2021-08-25 RX ADMIN — LEVOTHYROXINE SODIUM 250 MCG: 0.12 TABLET ORAL at 05:34

## 2021-08-25 RX ADMIN — AMLODIPINE BESYLATE 10 MG: 10 TABLET ORAL at 08:46

## 2021-08-25 ASSESSMENT — PAIN SCALES - GENERAL
PAINLEVEL_OUTOF10: 0
PAINLEVEL_OUTOF10: 3
PAINLEVEL_OUTOF10: 7

## 2021-08-25 NOTE — PROGRESS NOTES
Patient was scheduled for  on day shift at 1700H by [facility]. [facility] has a delay in transport per primary nurse and was moved around 2030 H  time. Followed up with [facility] at this time because no [facility] showed [facility] to  the patient. Patient wasn't booked for transport per [facility] claims . [facility] if they are still able to accept the patient at this time, they said they can't take the patient after 2200 H. Supervisor made aware and Primary RN made aware.  Electronically signed by Mike Taylor RN on 8/24/2021 at 11:15 PM

## 2021-08-25 NOTE — PROGRESS NOTES
Provider Misa Martinez notified patient will not be discharging today because of transportation issues. PO Pain medication requested at this time. Norco 5/325 ordered x 1 dose.

## 2021-08-25 NOTE — PROGRESS NOTES
Patient discharged to [facility], [facility] OF [facility]. [facility] providing transportation.  Electronically signed by Vaughn Hoffman RN on 8/25/2021 at 10:59 AM

## 2021-08-25 NOTE — PROGRESS NOTES
Pt awake with complaint of increased back pain and anxiety.  Prn Ativan and morphine administered per Mar.

## 2021-08-25 NOTE — PLAN OF CARE
Problem: Falls - Risk of:  Goal: Will remain free from falls  Description: Will remain free from falls  8/25/2021 1057 by Letty Goodrich RN  Outcome: Completed  8/25/2021 0236 by Kun Guevara RN  Outcome: Ongoing  Goal: Absence of physical injury  Description: Absence of physical injury  8/25/2021 1057 by Letty Goodrich RN  Outcome: Completed  8/25/2021 0236 by Kun Guevara RN  Outcome: Ongoing     Problem: Skin Integrity:  Goal: Will show no infection signs and symptoms  Description: Will show no infection signs and symptoms  8/25/2021 1057 by Letty Goodrich RN  Outcome: Completed  8/25/2021 0236 by Kun Guevara RN  Outcome: Ongoing  Goal: Absence of new skin breakdown  Description: Absence of new skin breakdown  8/25/2021 1057 by Letty Goodrich RN  Outcome: Completed  8/25/2021 0236 by Kun Guevara RN  Outcome: Ongoing     Problem: Pain:  Goal: Pain level will decrease  Description: Pain level will decrease  8/25/2021 1057 by Letty Goodrich RN  Outcome: Completed  8/25/2021 0236 by Kun Guevara RN  Outcome: Ongoing  Goal: Control of acute pain  Description: Control of acute pain  8/25/2021 1057 by Letty Goodrich RN  Outcome: Completed  8/25/2021 0236 by Kun Guevara RN  Outcome: Ongoing  Goal: Control of chronic pain  Description: Control of chronic pain  8/25/2021 1057 by Letty Goodrich RN  Outcome: Completed  8/25/2021 0236 by Kun Guevara RN  Outcome: Ongoing     Problem: Nutrition  Goal: Optimal nutrition therapy  8/25/2021 1057 by Letty Goodrich RN  Outcome: Completed  8/25/2021 0236 by Kun Guevara RN  Outcome: Ongoing

## 2021-08-25 NOTE — PLAN OF CARE
Problem: Falls - Risk of: Bed alarm in place and call light within reach.    Goal: Will remain free from falls  Description: Will remain free from falls  Outcome: Ongoing  Goal: Absence of physical injury  Description: Absence of physical injury  Outcome: Ongoing     Problem: Skin Integrity:  Goal: Will show no infection signs and symptoms  Description: Will show no infection signs and symptoms  Outcome: Ongoing  Goal: Absence of new skin breakdown  Description: Absence of new skin breakdown  Outcome: Ongoing     Problem: Pain:  Goal: Pain level will decrease  Description: Pain level will decrease  Outcome: Ongoing  Goal: Control of acute pain  Description: Control of acute pain  Outcome: Ongoing  Goal: Control of chronic pain  Description: Control of chronic pain  Outcome: Ongoing     Problem: Nutrition  Goal: Optimal nutrition therapy  Outcome: Ongoing

## 2021-08-25 NOTE — CARE COORDINATION
Transportation rescheduled for 1130 today. [id number].     Electronically signed by Mordecai Klinefelter, RN on 8/25/21 at 8:19 AM EDT

## 2021-09-23 NOTE — DISCHARGE SUMMARY
Hospital Medicine Discharge Summary    Patient ID: Agustina Doyle      Patient's PCP: Som Pall, APRN - CNP    Admit Date: 8/16/2021     Discharge Date: 8/25/2021      Admitting Physician: Harper Thomas MD     Discharge Physician: Jesus Cruz MD     Discharge Diagnoses: Active Hospital Problems    Diagnosis Date Noted    Acute on chronic renal insufficiency [N28.9, N18.9] 08/16/2021    COPD exacerbation ([address] [J44.1] 06/25/2014    HTN (hypertension) [I10] 12/20/2013    Hyperlipemia [E78.5] 12/20/2013    Hypothyroid [E03.9]        The patient was seen and examined on day of discharge and this discharge summary is in conjunction with any daily progress note from day of discharge. Hospital Course:     Assessments:  MARIANELA on likely CKD stage III   Severe metabolic acidosis  Generalized weakness  Anxiety,unspecified  Nonzero troponin  hypothryoidism     Continue to monitor UOP  Stopped Bicarb gtt, and now IVF stopped  Consult nephrology:  Holding lisinipril  PT/OT- recommend home care  Hydroxyzine and Ativan PRN given for c/o anxiety  Monitor TSH in 6-8 weeks for improvement, patient with concern for noncompliance with medication  Initial EKG without acute changes      Exam:     BP (!) 164/75   Pulse 59   Temp 97.8 °F (36.6 °C) (Oral)   Resp 14   Ht 5' 4\" (1.626 m)   Wt 107 lb 9.4 oz (48.8 kg)   SpO2 96%   BMI 18.47 kg/m²     General appearance: No apparent distress, appears stated age and cooperative. HEENT: Pupils equal, round, and reactive to light. Conjunctivae/corneas clear. Neck: Supple, with full range of motion. No jugular venous distention. Trachea midline. Respiratory:  Normal respiratory effort. Clear to auscultation, bilaterally without Rales/Wheezes/Rhonchi. Cardiovascular: Regular rate and rhythm with normal S1/S2 without murmurs, rubs or gallops. Abdomen: Soft, non-tender, non-distended with normal bowel sounds.   Musculoskelatal: No clubbing, cyanosis or edema bilaterally. Full range of motion without deformity. Skin: Skin color, texture, turgor normal.  No rashes or lesions. Neurologic:  Neurovascularly intact without any focal sensory/motor deficits. Cranial nerves: II-XII intact, grossly non-focal.  Psychiatric: Alert and oriented, thought content appropriate, normal insight      Consults:     IP CONSULT TO HOSPITALIST  IP CONSULT TO CASE MANAGEMENT  IP CONSULT TO NEPHROLOGY    Significant Diagnostic Studies:          Radiology:  CT HEAD WO CONTRAST   Final Result   No acute intracranial abnormality.       Encephalomalacia in the right frontal periventricular white matter/corona   radiata, likely from prior insult/infarction.           XR CHEST PORTABLE   Final Result   No acute finding in the chest.             PCP/SNF to follow up: Renal function panel    Disposition:  Home    Condition on D/C:  Stable     Discharge Instructions/Follow-up:  F/u with Nephrology within 1-2 weeks    Code Status:  Prior     Activity: activity as tolerated    Diet: cardiac diet    Labs: For convenience and continuity at follow-up the following most recent labs are provided:      CBC:    Lab Results   Component Value Date    WBC 6.1 08/16/2021    HGB 11.2 08/16/2021    HCT 33.3 08/16/2021     08/16/2021       Renal:    Lab Results   Component Value Date     08/25/2021    K 4.3 08/25/2021    K 3.7 08/16/2021     08/25/2021    CO2 21 08/25/2021    BUN 21 08/25/2021    CREATININE 2.2 08/25/2021    CALCIUM 9.0 08/25/2021    PHOS 2.6 08/25/2021       Discharge Medications:     Discharge Medication List as of 8/24/2021  4:52 PM           Details   amLODIPine (NORVASC) 10 MG tablet Take 1 tablet by mouth daily, Disp-30 tablet, R-3Print              Details   LORazepam (ATIVAN) 0.5 MG tablet Take 1 tablet by mouth every 8 hours as needed for Anxiety for up to 3 days. , Disp-9 tablet, R-0Print      famotidine (PEPCID) 10 MG tablet Take 1 tablet by mouth daily, Disp-60 tablet, R-3Print              Details   SYNTHROID 125 MCG tablet TAKE 2 TABLETS BY MOUTH EVERY DAY, Disp-60 tablet, R-3, DAWFAX MDNormal      atorvastatin (LIPITOR) 40 MG tablet Take 1 tablet by mouth daily. , Disp-30 tablet, R-3      melatonin (RA MELATONIN) 3 MG TABS tablet Take 1 tablet by mouth daily. , Disp-30 tablet, R-0             Time Spent on discharge is more than 45 minutes in the examination, evaluation, counseling and review of medications and discharge plan. Signed: Ken Ly MD   9/23/2021      Thank you EWELINA Jones - CHIDI for the opportunity to be involved in this patient's care. If you have any questions or concerns please feel free to contact me at [phone].